# Patient Record
Sex: FEMALE | Race: WHITE | NOT HISPANIC OR LATINO | Employment: PART TIME | URBAN - METROPOLITAN AREA
[De-identification: names, ages, dates, MRNs, and addresses within clinical notes are randomized per-mention and may not be internally consistent; named-entity substitution may affect disease eponyms.]

---

## 2017-12-26 ENCOUNTER — HOSPITAL ENCOUNTER (OUTPATIENT)
Facility: MEDICAL CENTER | Age: 22
End: 2017-12-26
Attending: PHYSICIAN ASSISTANT
Payer: COMMERCIAL

## 2017-12-26 ENCOUNTER — INITIAL PRENATAL (OUTPATIENT)
Dept: OBGYN | Facility: CLINIC | Age: 22
End: 2017-12-26
Payer: COMMERCIAL

## 2017-12-26 VITALS
HEIGHT: 68 IN | SYSTOLIC BLOOD PRESSURE: 100 MMHG | WEIGHT: 119 LBS | BODY MASS INDEX: 18.04 KG/M2 | DIASTOLIC BLOOD PRESSURE: 60 MMHG

## 2017-12-26 DIAGNOSIS — Z34.01 SUPERVISION OF NORMAL FIRST PREGNANCY IN FIRST TRIMESTER: Primary | ICD-10-CM

## 2017-12-26 DIAGNOSIS — Z34.82 PRENATAL CARE, SUBSEQUENT PREGNANCY IN SECOND TRIMESTER: ICD-10-CM

## 2017-12-26 LAB
APPEARANCE UR: NORMAL
BILIRUB UR STRIP-MCNC: NORMAL MG/DL
COLOR UR AUTO: NORMAL
GLUCOSE UR STRIP.AUTO-MCNC: NORMAL MG/DL
KETONES UR STRIP.AUTO-MCNC: NORMAL MG/DL
LEUKOCYTE ESTERASE UR QL STRIP.AUTO: NORMAL
NITRITE UR QL STRIP.AUTO: NORMAL
PH UR STRIP.AUTO: 6 [PH] (ref 5–8)
PROT UR QL STRIP: NORMAL MG/DL
RBC UR QL AUTO: NORMAL
SP GR UR STRIP.AUTO: 1.02
UROBILINOGEN UR STRIP-MCNC: NORMAL MG/DL

## 2017-12-26 PROCEDURE — 87491 CHLMYD TRACH DNA AMP PROBE: CPT

## 2017-12-26 PROCEDURE — 81002 URINALYSIS NONAUTO W/O SCOPE: CPT | Performed by: PHYSICIAN ASSISTANT

## 2017-12-26 PROCEDURE — 88175 CYTOPATH C/V AUTO FLUID REDO: CPT

## 2017-12-26 PROCEDURE — 59401 PR NEW OB VISIT: CPT | Performed by: PHYSICIAN ASSISTANT

## 2017-12-26 PROCEDURE — 87591 N.GONORRHOEAE DNA AMP PROB: CPT

## 2017-12-26 ASSESSMENT — PATIENT HEALTH QUESTIONNAIRE - PHQ9: CLINICAL INTERPRETATION OF PHQ2 SCORE: 0

## 2017-12-26 NOTE — PROGRESS NOTES
NOB visit  Pt c/o having cramping, N&V and headaches. Denies any other complications.  Pt declines flu vaccine today.   # [phone]

## 2017-12-26 NOTE — LETTER
Cystic Fibrosis Carrier Testing  Tnoy Foreman    The following information is about a blood test that can be done to determine if you and/or your partner carry the gene for cystic fibrosis.    WHAT IS CYSTIC FIBROSIS?  · Cystic fibrosis (CF) is an inherited disease that affects more than 25,000 American children and young adults.  · Symptoms of CF vary but include lung congestion, pneumonia, diarrhea and poor growth.  Most people with CF have severe medical problems and some die at a young age.  Others have so few symptoms they are unaware they have CF.  · CF does not affect intelligence.  · Although there is no cure for CF at this time, scientists are making progress in improving treatment and in searching for a cure.  In the past many people with CF  at a very young age.  Today, many are living into their 20’s and 30’s.    IS THERE A CHANCE MY BABY COULD HAVE CYSTIC FIBROSIS?  · You can have a child with CF even if there is no history in your family (see chart below).  · CF testing can help determine if you are a carrier and at risk to have a child with CF.  Note: if both parents are carriers, there is a 1 in 4 (25%) chance with each pregnancy that they will have a child with CF.  · Carriers have one normal CF gene and one altered CF gene.  · People with CF have two altered CF genes.  · Most people have two normal copies of the CF gene.    Approximate risk that a couple with no family history of cystic fibrosis will have a child with cystic fibrosis:    Ethnic background / Risk     couple:  1 in 2,500   couple:  1 in 15,000            couple:  1 in 8,000     American couple:  1 in 32,000     WHAT TESTING IS AVAILABLE?  · There is a blood test that can be done to find out if you or your partner is a carrier.  · It is important to understand that CF carrier testing does not detect all CF carriers.  · If the test shows that you are both CF carriers, you unborn baby can be  tested to find out if the baby has CF.    HOW MUCH DOES IT COST TO HAVE CYSTIC FIBROSIS CARRIER TESTING?  · Cost and insurance coverage for CF carrier testing vary depending upon the laboratory used and your insurance policy.  · The average cost for CF carrier testing is $780 per person.  · Your genetic counselor can provide you with more information about cystic fibrosis carrier testing.    _____  Yes, I am interested in discussing carrier testing with a genetic counselor.    _____  No, I am not interested in CF carrier testing or in receiving more information about CF carrier testing.      Client signature: ________________________________________  12/26/2017

## 2017-12-27 DIAGNOSIS — Z34.01 SUPERVISION OF NORMAL FIRST PREGNANCY IN FIRST TRIMESTER: ICD-10-CM

## 2017-12-29 LAB
C TRACH DNA GENITAL QL NAA+PROBE: NEGATIVE
CYTOLOGY REG CYTOL: NORMAL
N GONORRHOEA DNA GENITAL QL NAA+PROBE: NEGATIVE
SPECIMEN SOURCE: NORMAL

## 2018-01-18 ENCOUNTER — HOSPITAL ENCOUNTER (EMERGENCY)
Facility: MEDICAL CENTER | Age: 23
End: 2018-01-18
Attending: EMERGENCY MEDICINE
Payer: COMMERCIAL

## 2018-01-18 ENCOUNTER — APPOINTMENT (OUTPATIENT)
Dept: RADIOLOGY | Facility: MEDICAL CENTER | Age: 23
End: 2018-01-18
Attending: EMERGENCY MEDICINE
Payer: COMMERCIAL

## 2018-01-18 VITALS
HEART RATE: 78 BPM | HEIGHT: 65 IN | WEIGHT: 121.69 LBS | BODY MASS INDEX: 20.28 KG/M2 | RESPIRATION RATE: 20 BRPM | SYSTOLIC BLOOD PRESSURE: 121 MMHG | TEMPERATURE: 98.6 F | DIASTOLIC BLOOD PRESSURE: 78 MMHG | OXYGEN SATURATION: 99 %

## 2018-01-18 DIAGNOSIS — O02.1 FETAL DEMISE BEFORE 20 WEEKS WITH RETENTION OF DEAD FETUS: ICD-10-CM

## 2018-01-18 LAB
ALBUMIN SERPL BCP-MCNC: 3.5 G/DL (ref 3.2–4.9)
ALBUMIN/GLOB SERPL: 0.9 G/DL
ALP SERPL-CCNC: 46 U/L (ref 30–99)
ALT SERPL-CCNC: 8 U/L (ref 2–50)
ANION GAP SERPL CALC-SCNC: 10 MMOL/L (ref 0–11.9)
APPEARANCE UR: CLEAR
AST SERPL-CCNC: 17 U/L (ref 12–45)
B-HCG SERPL-ACNC: 1161.5 MIU/ML (ref 0–5)
BACTERIA #/AREA URNS HPF: ABNORMAL /HPF
BASOPHILS # BLD AUTO: 0.3 % (ref 0–1.8)
BASOPHILS # BLD: 0.04 K/UL (ref 0–0.12)
BILIRUB SERPL-MCNC: 0.3 MG/DL (ref 0.1–1.5)
BILIRUB UR QL STRIP.AUTO: NEGATIVE
BUN SERPL-MCNC: 13 MG/DL (ref 8–22)
CALCIUM SERPL-MCNC: 9.3 MG/DL (ref 8.5–10.5)
CAOX CRY #/AREA URNS HPF: ABNORMAL /HPF
CHLORIDE SERPL-SCNC: 106 MMOL/L (ref 96–112)
CO2 SERPL-SCNC: 23 MMOL/L (ref 20–33)
COLOR UR: YELLOW
CREAT SERPL-MCNC: 0.6 MG/DL (ref 0.5–1.4)
CULTURE IF INDICATED INDCX: YES UA CULTURE
EOSINOPHIL # BLD AUTO: 0.3 K/UL (ref 0–0.51)
EOSINOPHIL NFR BLD: 2.4 % (ref 0–6.9)
EPI CELLS #/AREA URNS HPF: ABNORMAL /HPF
ERYTHROCYTE [DISTWIDTH] IN BLOOD BY AUTOMATED COUNT: 41.7 FL (ref 35.9–50)
GLOBULIN SER CALC-MCNC: 3.7 G/DL (ref 1.9–3.5)
GLUCOSE SERPL-MCNC: 69 MG/DL (ref 65–99)
GLUCOSE UR STRIP.AUTO-MCNC: NEGATIVE MG/DL
HCG UR QL: POSITIVE
HCT VFR BLD AUTO: 37 % (ref 37–47)
HGB BLD-MCNC: 12.9 G/DL (ref 12–16)
HYALINE CASTS #/AREA URNS LPF: ABNORMAL /LPF
IMM GRANULOCYTES # BLD AUTO: 0.06 K/UL (ref 0–0.11)
IMM GRANULOCYTES NFR BLD AUTO: 0.5 % (ref 0–0.9)
KETONES UR STRIP.AUTO-MCNC: ABNORMAL MG/DL
LEUKOCYTE ESTERASE UR QL STRIP.AUTO: ABNORMAL
LYMPHOCYTES # BLD AUTO: 1.58 K/UL (ref 1–4.8)
LYMPHOCYTES NFR BLD: 12.6 % (ref 22–41)
MCH RBC QN AUTO: 30.9 PG (ref 27–33)
MCHC RBC AUTO-ENTMCNC: 34.9 G/DL (ref 33.6–35)
MCV RBC AUTO: 88.7 FL (ref 81.4–97.8)
MICRO URNS: ABNORMAL
MONOCYTES # BLD AUTO: 0.89 K/UL (ref 0–0.85)
MONOCYTES NFR BLD AUTO: 7.1 % (ref 0–13.4)
NEUTROPHILS # BLD AUTO: 9.71 K/UL (ref 2–7.15)
NEUTROPHILS NFR BLD: 77.1 % (ref 44–72)
NITRITE UR QL STRIP.AUTO: NEGATIVE
NRBC # BLD AUTO: 0 K/UL
NRBC BLD-RTO: 0 /100 WBC
NUMBER OF RH DOSES IND 8505RD: NORMAL
PH UR STRIP.AUTO: 5 [PH]
PLATELET # BLD AUTO: 259 K/UL (ref 164–446)
PMV BLD AUTO: 9 FL (ref 9–12.9)
POTASSIUM SERPL-SCNC: 3.4 MMOL/L (ref 3.6–5.5)
PROT SERPL-MCNC: 7.2 G/DL (ref 6–8.2)
PROT UR QL STRIP: NEGATIVE MG/DL
RBC # BLD AUTO: 4.17 M/UL (ref 4.2–5.4)
RBC # URNS HPF: ABNORMAL /HPF
RBC UR QL AUTO: ABNORMAL
RH BLD: NORMAL
SODIUM SERPL-SCNC: 139 MMOL/L (ref 135–145)
SP GR UR STRIP.AUTO: 1.03
UROBILINOGEN UR STRIP.AUTO-MCNC: 1 MG/DL
WBC # BLD AUTO: 12.6 K/UL (ref 4.8–10.8)
WBC #/AREA URNS HPF: ABNORMAL /HPF

## 2018-01-18 PROCEDURE — 81025 URINE PREGNANCY TEST: CPT

## 2018-01-18 PROCEDURE — 76815 OB US LIMITED FETUS(S): CPT

## 2018-01-18 PROCEDURE — 99284 EMERGENCY DEPT VISIT MOD MDM: CPT

## 2018-01-18 PROCEDURE — 85025 COMPLETE CBC W/AUTO DIFF WBC: CPT

## 2018-01-18 PROCEDURE — 81001 URINALYSIS AUTO W/SCOPE: CPT

## 2018-01-18 PROCEDURE — 80053 COMPREHEN METABOLIC PANEL: CPT

## 2018-01-18 PROCEDURE — 36415 COLL VENOUS BLD VENIPUNCTURE: CPT

## 2018-01-18 PROCEDURE — 86901 BLOOD TYPING SEROLOGIC RH(D): CPT

## 2018-01-18 PROCEDURE — 87086 URINE CULTURE/COLONY COUNT: CPT

## 2018-01-18 PROCEDURE — 84702 CHORIONIC GONADOTROPIN TEST: CPT

## 2018-01-18 ASSESSMENT — PAIN SCALES - GENERAL: PAINLEVEL_OUTOF10: 5

## 2018-01-19 NOTE — ED PROVIDER NOTES
"ED PROVIDER NOTE    Scribed for ZAHRAA Theodore.* by Milan Salomon. 1/18/2018, 10:05 PM.    This is an addendum to the note on Tony Foreman. For further details and full chart entry, see the previously signed ED Provider Note written by Dr. Ha ([facility]).      9:00 PM - I discussed the patient's case with Dr. Ha (ERP) who will transfer care of the patient to me at this time.        10:05 PM I discussed the patient's case and the above findings with radiology who reports fetal demise on ultrasound.    10:05 PM [facility].    10:12 PM I discussed the patient's case and the above findings with Dr. Stein ([facility]) who would like the patient to follow up with the pregnancy center tomorrow.    10:16 PM - Re-examined; The patient is resting in bed comfortably. I discussed her above findings and plans for discharge with a referral to the pregnancy center and instructed to return to the ED if her symptoms worsen. Patient understands and agrees. Her vitals prior to discharge were: /69   Pulse 94   Temp 36.9 °C (98.5 °F)   Resp 16   Ht 1.651 m (5' 5\")   Wt 55.2 kg (121 lb 11.1 oz)   LMP 10/13/2017   SpO2 100%   BMI 20.25 kg/m²     Medical Decision Making: Patient presents with vaginal bleeding and appears may have had a fetal demise around 10 weeks. She is starting to have bleeding now and may pass naturally referred the patient back to her ObGyn to discuss the different options and risks and benefits.    The patient will return for new or worsening symptoms and is stable at the time of discharge.    DISPOSITION:  Patient will be discharged home in stable condition.    FOLLOW UP:  [facility]  [address] [place] [id number] [phone]  Go in 1 day        FINAL IMPRESSION   1. Fetal demise before 20 weeks with retention of dead fetus       I, Milan Salomon (Scribe), am scribing for, and in the presence of, Benito Oscar, " ERIC*.    Electronically signed by: Milan Salomon (Scribe), 1/18/2018    I, ERIC Theodore* personally performed the services described in this documentation, as scribed by Milan Salomon in my presence, and it is both accurate and complete.    The note accurately reflects work and decisions made by me.  Benito Oscar  1/19/2018  4:10 AM

## 2018-01-19 NOTE — DISCHARGE INSTRUCTIONS
Return if you have heavy vaginal bleeding greater than a pad an hour.   Intrauterine Fetal Demise  About one percent of normal, uncomplicated pregnancies end in fetal death (intrauterine fetal demise, IUFD). It is considered a fetal death when it occurs after the 20th week of pregnancy. It is considered a miscarriage when a fetal death occurs in the first 20 weeks. The mother's health is usually not in danger. Usually, there is nothing that can be done to prevent it.  CAUSES  · Often the cause is unknown.   · Examination of the stillborn fetus after delivery may show an abnormality in the umbilical cord. An exam my also show a problem with the placenta or fetus. These problems may include infections or a variety of birth defects and genetic disorders.   · The pregnancy continues for 42 weeks or later (post term pregnancy).   · Conditions in the mother such as diabetes, high blood pressure, and numerous other medical, physical or poor lifestyle choices (illegal drugs, alcohol, smoking) increase the risk for fetal death. Often, however, risk factors are unknown.   · Multiple pregnancies (twins or more) increase the risk of fetal death.   SYMPTOMS   · The mother may not notice symptoms in the early stages of pregnancy. Learning what is wrong (diagnosis) is based on:   · The loss of baby's heart sounds.   · The lack of increasing belly (abdominal) growth.   · Ultrasound studies which suggest death of the fetus.   · In later stages of pregnancy, a woman may be aware of changes in the fetal movement (kicks), or that the movement has stopped.   RELATED COMPLICATIONS  · Disseminated intravascular coagulation (DIC) is a problem with blood clotting. This can result in severe bleeding and rarely develops late after fetal death.   · Infection of the products of the pregnancy (fetal materials).   · Increase bleeding from retained fetal parts or placenta.   TREATMENT   · Treatment should be accomplished within 2 weeks of the  discovered fetal death.   · To confirm the fetal death, diagnostic tests are done such as:   · X-rays.   · Ultrasound.   · Amniotic fluid studies (looking at the fluid in the sac surrounding the baby).   · Most women, on learning that their fetus is dead, prefer early removal of the contents of the womb (uterus). In the first three months of pregnancy (first trimester), this is usually done by D and C or with suction curettage. Suction curettage is a technique used to remove the dead fetus and other tissue of the pregnancy from the uterus. It uses an instrument somewhat like a straw, connected by tubing to a machine, that your caregiver uses to suck out the dead contents of the uterus. NOTE: Suction curettage may be done in the second and third trimester after delivery of the dead fetus only to make sure there is no placental tissue left in the uterus but not to suction out the fetus.   · In the second trimester, treatment is more frequently accomplished with high doses of a drug, prostaglandin E (Prostin) suppositories or in combination with laminaria (as specialized seaweed product that absorbs moisture and expands to gradually stretch and open the cervix). Prostin (T) causes labor to start.   · In the third trimester, it may be accomplished with laminaria and misoprostol vaginal suppositories to induce labor. It may also be done with the drugs intravenous oxytocin plus prostaglandin E.   · If there was an infection involved with the fetal death, you will be given an antibiotic. You will be given Rho-lila if you are Rh negative and the baby is Rh positive (a vaccine to prevent Rh problems with a future pregnancy). An additional treatment option is to wait for spontaneous labor, which usually occurs within 2 weeks, but may be longer. This is called expectant therapy.   · Following removal of the products of the pregnancy, the stillborn fetus is usually examined by a specialist (pathologist) to determine if problems  are present that may reoccur in another pregnancy. This can help plan future pregnancies. That planning will also include treatment which will best guarantee a good outcome in future pregnancies.   · Your caregiver can also help you deal with feelings of loss, guilt, loneliness, anxiety, and hostility. Family and friends can be helpful. If severe grief lasts longer than several months, professional counseling may be helpful. Joining a grief support group may be useful.   · Any medicines prescribed will depend on the type of treatment received.   Other problems can be cared for with your caregivers. There may be discussions on whether or not to see, touch or photograph the infant, whether to name the infant, what to do with the remains (burial or cremation), and holding Mu-ism services.  HOME CARE INSTRUCTIONS   · Restrictions are usually not necessary unless associated with the delivery choice.   · Sexual intercourse should be avoided for 4 to 6 weeks. Starting another pregnancy should be delayed several months, or as suggested by your caregiver.   · Do not use tampons or douche.   · Only take over-the-counter or prescription medicines for pain, discomfort, or fever as directed by your caregiver. Do not take aspirin it can cause you to bleed. Call your caregiver for a prescription for stronger pain medication if you need it.   · No special diet is necessary unless you have diabetes or other medical problems that require a special diet.   · Take showers instead of baths until your caregiver tells you it is okay.   · Ask your caregiver when you can return to driving and to your everyday activities.   · Make an appointment with your caregiver for follow up care.   PREVENTION   · Eliminate any of the causes, if possible, that were found after evaluating the fetus.   · Control any medical problems you may have before or during the pregnancy.   · Avoid illegal drugs, alcohol and smoking.   · Maintain good prenatal care  and follow your caregiver's treatment and advice.   · Report any concerns or unusual changes you notice during your pregnancy.   · More frequent prenatal visits may be necessary with the next child.   SEEK MEDICAL CARE IF:   · You develop abnormal vaginal discharge.   · You develop a temperature 102° F (38.9° C) or higher.   · You are getting dizzy and faint.   · You are feeling depressed.   SEEK IMMEDIATE MEDICAL CARE IF:   During pregnancy:  · You fail to gain weight, or your abdomen is not increasing in size.   · Your unborn child appears to have less movement or stopped moving. Keep your medical conditions under control.   After delivery:  · You have heavy vaginal bleeding.   · You have chills and fever.   · You have chest pain.   · You have shortness of breath.   · You have pain or swelling or redness of your leg.   · Following the death of a fetus, you or a family member need help or emotional support in coping with the grief process.   MAKE SURE YOU:   · Understand these instructions.   · Will watch your condition.   · Will get help right away if you are not doing well or get worse.   Document Released: 12/18/2006 Document Revised: 03/11/2013 Document Reviewed: 09/11/2008  [employer]® Patient Information ©2013 [employer].

## 2018-01-19 NOTE — PROGRESS NOTES
MD received call from the ER physician. Established [facility] patient presented to ER with light vaginal spotting. Vital signs stable. H/H 12.9/37. Ultrasound revealed 10wk fetal demise. Advised for patient to follow up at the [facility] Friday 1/19 to discuss her options for treatment (observation, misoprostol, or D&C). Message sent to [facility]  to call pt for appt.

## 2018-01-19 NOTE — ED NOTES
Chief Complaint   Patient presents with   • Vaginal Bleeding     14weeks pregnant , onset yesterday   • Abdominal Cramping     Pt ambulated to triage ,14weeks pregnant . Vaginal bleeding and cramping started yesterday.   nad.

## 2018-01-19 NOTE — ED NOTES
Pt is aaox4. Pt is in nad. Pt is being d/c. Pt was given d/c instructions and she stated understanding. Pt denies any bleeding at this time.pt is able to amb woi assistance. Pt left with family

## 2018-01-19 NOTE — ED PROVIDER NOTES
"ED Provider Note    Scribed for Caitlin Ha M.D. by Holden Yung. 1/18/2018  7:40 PM    Primary care provider: Pcp Pt States None  Means of arrival: Walk-in  History obtained from: Patient  History limited by: None    CHIEF COMPLAINT  Chief Complaint   Patient presents with   • Vaginal Bleeding     14weeks pregnant , onset yesterday   • Abdominal Cramping       ZEB Foreman is a 22 y.o. female who presents to the Emergency Department for evaluation of vaginal bleeding and abdominal cramping onset yesterday. The patient is currently 13 weeks 6 days pregnant. She describes the bleeding similar to a regular menstrual period with associated minimal tissue noted. She denies any nausea or vomiting. She is G1 and [facility].     REVIEW OF SYSTEMS  GI: Abdominal cramping, no nausea or vomiting.   : Vaginal bleeding.     See history of present illness. All other systems are negative.  C.     PAST MEDICAL HISTORY  G1.     SURGICAL HISTORY  patient denies any surgical history    SOCIAL HISTORY  Social History   Substance Use Topics   • Smoking status: Never Smoker   • Smokeless tobacco: Never Used   • Alcohol use No      History   Drug Use No       FAMILY HISTORY  Family History   Problem Relation Age of Onset   • Diabetes Mother    • Hypertension Mother    • Diabetes Father    • Hypertension Father        CURRENT MEDICATIONS  No current facility-administered medications for this encounter.     Current Outpatient Prescriptions:   •  Prenatal MV-Min-Fe Fum-FA-DHA (PRENATAL 1 PO), Take  by mouth., Disp: , Rfl:   •  ibuprofen (ADVIL) 200 MG Tab, Take 400 mg by mouth every 6 hours as needed., Disp: , Rfl:     ALLERGIES  No Known Allergies    PHYSICAL EXAM  VITAL SIGNS: /69   Pulse 94   Temp 36.9 °C (98.5 °F)   Resp 16   Ht 1.651 m (5' 5\")   Wt 55.2 kg (121 lb 11.1 oz)   LMP 10/13/2017   SpO2 100%   BMI 20.25 kg/m²     Constitutional: Well developed, Well nourished, No acute distress, " Non-toxic appearance.   HEENT: Normocephalic, Atraumatic,  external ears normal, pharynx pink,  Mucous  Membranes moist, No rhinorrhea or mucosal edema  Eyes: PERRL, EOMI, Conjunctiva normal, No discharge.   Neck: Normal range of motion, No tenderness, Supple, No stridor.   Lymphatic: No lymphadenopathy    Cardiovascular: Regular Rate and Rhythm, No murmurs,  rubs, or gallops.   Thorax & Lungs: Lungs clear to auscultation bilaterally, No respiratory distress, No wheezes, rhales or rhonchi, No chest wall tenderness.   Abdomen: Bowel sounds normal, Soft, non tender, non distended,  No pulsatile masses., no rebound guarding or peritoneal signs.   Pelvic Exam: Assisted by female scribe, minimal bleeding in vault with tissue noted, os closed.   Skin: Warm, Dry, No erythema, No rash,   Back:  No CVA tenderness,  No spinal tenderness, bony crepitance, step offs, or instability.   Neurologic: Alert & oriented x 3, Normal motor function, Normal sensory function, No focal deficits noted. Normal reflexes. Normal Cranial Nerves.  Extremities: Equal, intact distal pulses, No cyanosis, clubbing or edema,  No tenderness.   Musculoskeletal: Good range of motion in all major joints. No tenderness to palpation or major deformities noted.     DIAGNOSTIC STUDIES / PROCEDURES    LABS  Results for orders placed or performed during the hospital encounter of 01/18/18   CBC WITH DIFFERENTIAL   Result Value Ref Range    WBC 12.6 (H) 4.8 - 10.8 K/uL    RBC 4.17 (L) 4.20 - 5.40 M/uL    Hemoglobin 12.9 12.0 - 16.0 g/dL    Hematocrit 37.0 37.0 - 47.0 %    MCV 88.7 81.4 - 97.8 fL    MCH 30.9 27.0 - 33.0 pg    MCHC 34.9 33.6 - 35.0 g/dL    RDW 41.7 35.9 - 50.0 fL    Platelet Count 259 164 - 446 K/uL    MPV 9.0 9.0 - 12.9 fL    Neutrophils-Polys 77.10 (H) 44.00 - 72.00 %    Lymphocytes 12.60 (L) 22.00 - 41.00 %    Monocytes 7.10 0.00 - 13.40 %    Eosinophils 2.40 0.00 - 6.90 %    Basophils 0.30 0.00 - 1.80 %    Immature Granulocytes 0.50 0.00 -  0.90 %    Nucleated RBC 0.00 /100 WBC    Neutrophils (Absolute) 9.71 (H) 2.00 - 7.15 K/uL    Lymphs (Absolute) 1.58 1.00 - 4.80 K/uL    Monos (Absolute) 0.89 (H) 0.00 - 0.85 K/uL    Eos (Absolute) 0.30 0.00 - 0.51 K/uL    Baso (Absolute) 0.04 0.00 - 0.12 K/uL    Immature Granulocytes (abs) 0.06 0.00 - 0.11 K/uL    NRBC (Absolute) 0.00 K/uL   COMP METABOLIC PANEL   Result Value Ref Range    Sodium 139 135 - 145 mmol/L    Potassium 3.4 (L) 3.6 - 5.5 mmol/L    Chloride 106 96 - 112 mmol/L    Co2 23 20 - 33 mmol/L    Anion Gap 10.0 0.0 - 11.9    Glucose 69 65 - 99 mg/dL    Bun 13 8 - 22 mg/dL    Creatinine 0.60 0.50 - 1.40 mg/dL    Calcium 9.3 8.5 - 10.5 mg/dL    AST(SGOT) 17 12 - 45 U/L    ALT(SGPT) 8 2 - 50 U/L    Alkaline Phosphatase 46 30 - 99 U/L    Total Bilirubin 0.3 0.1 - 1.5 mg/dL    Albumin 3.5 3.2 - 4.9 g/dL    Total Protein 7.2 6.0 - 8.2 g/dL    Globulin 3.7 (H) 1.9 - 3.5 g/dL    A-G Ratio 0.9 g/dL   RH TYPE FOR RHOGAM FROM E.D.   Result Value Ref Range    Emergency Department Rh Typing POS     Number Of Rh Doses Indicated ZERO    URINALYSIS CULTURE, IF INDICATED   Result Value Ref Range    Color Yellow     Character Clear     Specific Gravity 1.031 <1.035    Ph 5.0 5.0 - 8.0    Glucose Negative Negative mg/dL    Ketones Trace (A) Negative mg/dL    Protein Negative Negative mg/dL    Bilirubin Negative Negative    Urobilinogen, Urine 1.0 Negative    Nitrite Negative Negative    Leukocyte Esterase Trace (A) Negative    Occult Blood Large (A) Negative    Micro Urine Req Microscopic     Culture Indicated Yes UA Culture   URINE MICROSCOPIC (W/UA)   Result Value Ref Range    WBC 2-5 /hpf    RBC 2-5 (A) /hpf    Bacteria Few (A) None /hpf    Epithelial Cells Few /hpf    Ca Oxalate Crystal Few /hpf    Hyaline Cast 0-2 /lpf   ESTIMATED GFR   Result Value Ref Range    GFR If African American >60 >60 mL/min/1.73 m 2    GFR If Non African American >60 >60 mL/min/1.73 m 2   POC URINE PREGNANCY   Result Value Ref Range     POC Urine Pregnancy Test Positive (A) Negative   All labs reviewed by me.    RADIOLOGY  US-OB PELVIS TRANSVAGINAL    (Results Pending)   The radiologist's interpretation of all radiological studies have been reviewed by me.    COURSE & MEDICAL DECISION MAKING  Nursing notes, VS, PMSFHx reviewed in chart.    7:44 PM - Patient seen and examined at bedside. Ordered US-OB pelvis transvaginal, RH type, urinalysis culture, HCG quant serum, POC urine pregnancy, CBC, and CMP to evaluate her symptoms. The differential diagnoses include but are not limited to: miscarriage, threatened AB, first trimester bleeding.     7:46 PM Pelvic Exam was performed with assisted female scribe as chaperone, see above note for more details.     Reviewed the patient's lab and imaging results, which reveals a normal hemoglobin. These results were discussed with the patient at bedside. Per ultrasound and quantitative hCG will be followed by Dr. Oscar he will give her the final results. I have recommended she follow [facility]. The patient will be discharged and should return if symptoms worsen or if new symptoms arise. The patient understands and agrees to plan.      The patient will return for new or worsening symptoms and is stable at the time of discharge.    The patient is referred to a primary physician for blood pressure management, diabetic screening, and for all other preventative health concerns.    DISPOSITION:  Patient will be discharged home in stable condition.    FOLLOW UP:  [facility]  [address] [place] [id number] [phone]  Call in 1 day  for recheck      OUTPATIENT MEDICATIONS:  New Prescriptions    No medications on file     FINAL IMPRESSION  1. , threatened          IHolden ([place]), am scribing for, and in the presence of, Caitlin Ha M.D..    Electronically signed by: Holden Yung ([place]), 2018    Caitlin KIM M.D. personally  performed the services described in this documentation, as scribed by Holden Yung in my presence, and it is both accurate and complete.    The note accurately reflects work and decisions made by me.  Caitlin Ha  1/18/2018  9:28 PM

## 2018-01-21 LAB
BACTERIA UR CULT: NORMAL
SIGNIFICANT IND 70042: NORMAL
SITE SITE: NORMAL
SOURCE SOURCE: NORMAL

## 2018-07-05 ENCOUNTER — NON-PROVIDER VISIT (OUTPATIENT)
Dept: OBGYN | Facility: CLINIC | Age: 23
End: 2018-07-05

## 2018-07-05 DIAGNOSIS — Z32.01 PREGNANCY EXAMINATION OR TEST, POSITIVE RESULT: ICD-10-CM

## 2018-07-05 LAB
INT CON NEG: NEGATIVE
INT CON POS: POSITIVE
POC URINE PREGNANCY TEST: POSITIVE

## 2018-07-05 PROCEDURE — 81025 URINE PREGNANCY TEST: CPT | Performed by: OBSTETRICS & GYNECOLOGY

## 2018-08-27 PROBLEM — Z34.01 SUPERVISION OF NORMAL FIRST PREGNANCY IN FIRST TRIMESTER: Status: RESOLVED | Noted: 2017-12-26 | Resolved: 2018-08-27

## 2018-08-31 ENCOUNTER — APPOINTMENT (OUTPATIENT)
Dept: OBGYN | Facility: CLINIC | Age: 23
End: 2018-08-31

## 2018-09-14 ENCOUNTER — APPOINTMENT (OUTPATIENT)
Dept: RADIOLOGY | Facility: MEDICAL CENTER | Age: 23
End: 2018-09-14
Attending: OBSTETRICS & GYNECOLOGY

## 2018-09-14 ENCOUNTER — HOSPITAL ENCOUNTER (EMERGENCY)
Facility: MEDICAL CENTER | Age: 23
End: 2018-09-14
Payer: MEDICAID

## 2018-09-14 ENCOUNTER — HOSPITAL ENCOUNTER (OUTPATIENT)
Facility: MEDICAL CENTER | Age: 23
End: 2018-09-14
Attending: OBSTETRICS & GYNECOLOGY | Admitting: OBSTETRICS & GYNECOLOGY

## 2018-09-14 VITALS
HEIGHT: 65 IN | DIASTOLIC BLOOD PRESSURE: 70 MMHG | WEIGHT: 150 LBS | TEMPERATURE: 98.1 F | RESPIRATION RATE: 16 BRPM | HEART RATE: 104 BPM | BODY MASS INDEX: 24.99 KG/M2 | SYSTOLIC BLOOD PRESSURE: 113 MMHG

## 2018-09-14 LAB
ABO GROUP BLD: NORMAL
BASOPHILS # BLD AUTO: 0.2 % (ref 0–1.8)
BASOPHILS # BLD: 0.02 K/UL (ref 0–0.12)
BLD GP AB SCN SERPL QL: NORMAL
EOSINOPHIL # BLD AUTO: 0.12 K/UL (ref 0–0.51)
EOSINOPHIL NFR BLD: 1.3 % (ref 0–6.9)
ERYTHROCYTE [DISTWIDTH] IN BLOOD BY AUTOMATED COUNT: 37.6 FL (ref 35.9–50)
HBV SURFACE AG SER QL: NEGATIVE
HCT VFR BLD AUTO: 31.8 % (ref 37–47)
HCV AB SER QL: NEGATIVE
HGB BLD-MCNC: 10.4 G/DL (ref 12–16)
HIV 1+2 AB+HIV1 P24 AG SERPL QL IA: NON REACTIVE
IMM GRANULOCYTES # BLD AUTO: 0.08 K/UL (ref 0–0.11)
IMM GRANULOCYTES NFR BLD AUTO: 0.8 % (ref 0–0.9)
LYMPHOCYTES # BLD AUTO: 1.36 K/UL (ref 1–4.8)
LYMPHOCYTES NFR BLD: 14.3 % (ref 22–41)
MCH RBC QN AUTO: 28.3 PG (ref 27–33)
MCHC RBC AUTO-ENTMCNC: 32.7 G/DL (ref 33.6–35)
MCV RBC AUTO: 86.6 FL (ref 81.4–97.8)
MONOCYTES # BLD AUTO: 0.66 K/UL (ref 0–0.85)
MONOCYTES NFR BLD AUTO: 6.9 % (ref 0–13.4)
NEUTROPHILS # BLD AUTO: 7.29 K/UL (ref 2–7.15)
NEUTROPHILS NFR BLD: 76.5 % (ref 44–72)
NRBC # BLD AUTO: 0 K/UL
NRBC BLD-RTO: 0 /100 WBC
PLATELET # BLD AUTO: 221 K/UL (ref 164–446)
PMV BLD AUTO: 9.1 FL (ref 9–12.9)
RBC # BLD AUTO: 3.67 M/UL (ref 4.2–5.4)
RH BLD: NORMAL
RUBV AB SER QL: 11.2 IU/ML
TREPONEMA PALLIDUM IGG+IGM AB [PRESENCE] IN SERUM OR PLASMA BY IMMUNOASSAY: NON REACTIVE
WBC # BLD AUTO: 9.5 K/UL (ref 4.8–10.8)

## 2018-09-14 PROCEDURE — 87340 HEPATITIS B SURFACE AG IA: CPT

## 2018-09-14 PROCEDURE — 86901 BLOOD TYPING SEROLOGIC RH(D): CPT

## 2018-09-14 PROCEDURE — 85025 COMPLETE CBC W/AUTO DIFF WBC: CPT

## 2018-09-14 PROCEDURE — 86900 BLOOD TYPING SEROLOGIC ABO: CPT

## 2018-09-14 PROCEDURE — 59025 FETAL NON-STRESS TEST: CPT | Performed by: OBSTETRICS & GYNECOLOGY

## 2018-09-14 PROCEDURE — 86780 TREPONEMA PALLIDUM: CPT

## 2018-09-14 PROCEDURE — 86850 RBC ANTIBODY SCREEN: CPT

## 2018-09-14 PROCEDURE — 86803 HEPATITIS C AB TEST: CPT

## 2018-09-14 PROCEDURE — 76805 OB US >/= 14 WKS SNGL FETUS: CPT

## 2018-09-14 PROCEDURE — 86762 RUBELLA ANTIBODY: CPT

## 2018-09-14 PROCEDURE — 36415 COLL VENOUS BLD VENIPUNCTURE: CPT

## 2018-09-14 PROCEDURE — 87389 HIV-1 AG W/HIV-1&-2 AB AG IA: CPT
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
## 2018-10-02 ENCOUNTER — HOSPITAL ENCOUNTER (OUTPATIENT)
Facility: MEDICAL CENTER | Age: 23
End: 2018-10-02
Attending: PHYSICIAN ASSISTANT
Payer: COMMERCIAL

## 2018-10-02 ENCOUNTER — INITIAL PRENATAL (OUTPATIENT)
Dept: OBGYN | Facility: CLINIC | Age: 23
End: 2018-10-02

## 2018-10-02 VITALS — BODY MASS INDEX: 26.63 KG/M2 | SYSTOLIC BLOOD PRESSURE: 98 MMHG | WEIGHT: 160 LBS | DIASTOLIC BLOOD PRESSURE: 70 MMHG

## 2018-10-02 DIAGNOSIS — Z34.90 ENCOUNTER FOR SUPERVISION OF NORMAL PREGNANCY, ANTEPARTUM, UNSPECIFIED GRAVIDITY: Primary | ICD-10-CM

## 2018-10-02 DIAGNOSIS — Z34.90 ENCOUNTER FOR SUPERVISION OF NORMAL PREGNANCY, ANTEPARTUM, UNSPECIFIED GRAVIDITY: ICD-10-CM

## 2018-10-02 DIAGNOSIS — Z34.83 ENCOUNTER FOR SUPERVISION OF OTHER NORMAL PREGNANCY IN THIRD TRIMESTER: ICD-10-CM

## 2018-10-02 DIAGNOSIS — O99.013 ANEMIA AFFECTING PREGNANCY IN THIRD TRIMESTER: ICD-10-CM

## 2018-10-02 PROBLEM — Z34.93 SUPERVISION OF NORMAL PREGNANCY IN THIRD TRIMESTER: Status: ACTIVE | Noted: 2018-10-02

## 2018-10-02 LAB
APPEARANCE UR: NORMAL
BILIRUB UR STRIP-MCNC: NORMAL MG/DL
COLOR UR AUTO: NORMAL
GLUCOSE UR STRIP.AUTO-MCNC: NEGATIVE MG/DL
KETONES UR STRIP.AUTO-MCNC: NEGATIVE MG/DL
LEUKOCYTE ESTERASE UR QL STRIP.AUTO: NORMAL
NITRITE UR QL STRIP.AUTO: NEGATIVE
PH UR STRIP.AUTO: 7 [PH] (ref 5–8)
PROT UR QL STRIP: NEGATIVE MG/DL
RBC UR QL AUTO: NORMAL
SP GR UR STRIP.AUTO: 1.02
UROBILINOGEN UR STRIP-MCNC: NORMAL MG/DL

## 2018-10-02 PROCEDURE — 8198 PREG CTR PKG RATE (SYSTEM): Performed by: PHYSICIAN ASSISTANT

## 2018-10-02 PROCEDURE — 81002 URINALYSIS NONAUTO W/O SCOPE: CPT | Performed by: PHYSICIAN ASSISTANT

## 2018-10-02 PROCEDURE — 59401 PR NEW OB VISIT: CPT | Performed by: PHYSICIAN ASSISTANT

## 2018-10-02 PROCEDURE — 90471 IMMUNIZATION ADMIN: CPT | Performed by: PHYSICIAN ASSISTANT

## 2018-10-02 PROCEDURE — 90715 TDAP VACCINE 7 YRS/> IM: CPT | Performed by: PHYSICIAN ASSISTANT

## 2018-10-02 ASSESSMENT — ENCOUNTER SYMPTOMS
PSYCHIATRIC NEGATIVE: 1
RESPIRATORY NEGATIVE: 1
NEUROLOGICAL NEGATIVE: 1
GASTROINTESTINAL NEGATIVE: 1
CONSTITUTIONAL NEGATIVE: 1
MUSCULOSKELETAL NEGATIVE: 1
EYES NEGATIVE: 1
CARDIOVASCULAR NEGATIVE: 1

## 2018-10-02 NOTE — LETTER
Cystic Fibrosis Carrier Testing  Tony Foreman    The following information is about a blood test that can be done to determine if you and/or your partner carry the gene for cystic fibrosis.    WHAT IS CYSTIC FIBROSIS?  · Cystic fibrosis (CF) is an inherited disease that affects more than 25,000 American children and young adults.  · Symptoms of CF vary but include lung congestion, pneumonia, diarrhea and poor growth.  Most people with CF have severe medical problems and some die at a young age.  Others have so few symptoms they are unaware they have CF.  · CF does not affect intelligence.  · Although there is no cure for CF at this time, scientists are making progress in improving treatment and in searching for a cure.  In the past many people with CF  at a very young age.  Today, many are living into their 20’s and 30’s.    IS THERE A CHANCE MY BABY COULD HAVE CYSTIC FIBROSIS?  · You can have a child with CF even if there is no history in your family (see chart below).  · CF testing can help determine if you are a carrier and at risk to have a child with CF.  Note: if both parents are carriers, there is a 1 in 4 (25%) chance with each pregnancy that they will have a child with CF.  · Carriers have one normal CF gene and one altered CF gene.  · People with CF have two altered CF genes.  · Most people have two normal copies of the CF gene.    Approximate risk that a couple with no family history of cystic fibrosis will have a child with cystic fibrosis:    Ethnic background / Risk     couple:  1 in 2,500   couple:  1 in 15,000            couple:  1 in 8,000     American couple:  1 in 32,000     WHAT TESTING IS AVAILABLE?  · There is a blood test that can be done to find out if you or your partner is a carrier.  · It is important to understand that CF carrier testing does not detect all CF carriers.  · If the test shows that you are both CF carriers, you unborn baby can be  tested to find out if the baby has CF.    HOW MUCH DOES IT COST TO HAVE CYSTIC FIBROSIS CARRIER TESTING?  · Cost and insurance coverage for CF carrier testing vary depending upon the laboratory used and your insurance policy.  · The average cost for CF carrier testing is $300 per person.  · Your genetic counselor can provide you with more information about cystic fibrosis carrier testing.    _____  Yes, I am interested in discussing carrier testing with a genetic counselor.    _____  No, I am not interested in CF carrier testing or in receiving more information about CF carrier testing.      Client signature: ________________________________________  10/2/2018

## 2018-10-02 NOTE — LETTER
"Count Your Baby's Movements  Another step to a healthy delivery    Tony Foreman,             Dept: [phone]    How Many Weeks Pregnant? 32w3d    Date to Begin Counting: 10/2/18              How to use this chart    One way for your physician to keep track of your baby's health is by knowing how often the baby moves (or \"kicks\") in your womb.  You can help your physician to do this by using this chart every day.    Every day, you should see how many hours it takes for your baby to move 10 times.  Start in the morning, as soon as you get up.    · First, write down the time your baby moves until you get to 10.  · Check off one box every time your baby moves until you get to 10.  · Write down the time you finished counting in the last column.  · Total how long it took to count up all 10 movements.  · Finally, fill in the box that shows how long this took.  After counting 10 movements, you no longer have to count any more that day.  The next morning, just start counting again as soon as you get up.    What should you call a \"movement\"?  It is hard to say, because it will feel different from one mother to another and from one pregnancy to the next.  The important thing is that you count the movements the same way throughout your pregnancy.  If you have more questions, you should ask your physician.    Count carefully every day!  SAMPLE:  Week 28    How many hours did it take to feel 10 movements?       Start  Time     1     2     3     4     5     6     7     8     9     10   Finish Time   Mon 8:20 ·  ·  ·  ·  ·  ·  ·  ·  ·  ·  11:40   Tue Wed Thu Fri               Sat               Sun                 IMPORTANT: You should contact your physician if it takes more than two hours for you to feel 10 movements.  Each morning, write down the time and start to count the movements of your baby.  Keep track by checking off one box every time you feel one movement.  When you have felt " "10 \"kicks\", write down the time you finished counting in the last column.  Then fill in the   box (over the check benja) for the number of hours it took.  Be sure to read the complete instructions on the previous page.            "

## 2018-10-02 NOTE — PROGRESS NOTES
Pt. Here for NOB visit today.  #[phone]  First prenatal care  Pt. States some cramping and [place]  Pharmacy verified  AFP pt is too late  PNP done in L&D  Pap 2017-WNL  BLANCA sheet instructions given  1 hr GTT today  Pt desires Tdap  Pt would like to wait till after delivery for Flu vaccine  CF test pt needs to talk over with FOB  Chaperone offered and present

## 2018-10-02 NOTE — PROGRESS NOTES
Subjective:      Tony Foreman is a 23 y.o. female who presents with New ob visit. Pt sure of LMP, and US at 32wk confirms MATTHEW 11/24/18. First pregnancy was SAB at 13wk without complications, no D&C needed. Pt had US and PNL done in L&D, all wnl, though low H/H, so pt instructed to start taking FeSO4 supplements. Pt denies PMHx, Shx. Taking PNV and DHA daily. NKDA. No tob, etoh or drug use. Pt currently denies cramping, bleeding or pain. +FM - FKC sheet given today.     PE: See below. Size c/w dates. +FHT by Doppler.   Cervix: Cl/50/-2, post, med, vtx    A/P: 1. IUP at 32w2d - PAP wnl 12/17, GC/CT done today. PNL wnl. 1hr GTT, H/H, RPR slip given. US wnl - no more needed. RTC 2 wks.  2. Late care - no AFP             HPI    Review of Systems   Constitutional: Negative.    HENT: Negative.    Eyes: Negative.    Respiratory: Negative.    Cardiovascular: Negative.    Gastrointestinal: Negative.    Genitourinary: Negative.    Musculoskeletal: Negative.    Skin: Negative.    Neurological: Negative.    Endo/Heme/Allergies: Negative.    Psychiatric/Behavioral: Negative.           Objective:     BP (!) 98/70   Wt 72.6 kg (160 lb)   LMP 02/17/2018 (Approximate)   BMI 26.63 kg/m²      Physical Exam   Constitutional: She appears well-developed and well-nourished.   HENT:   Head: Normocephalic and atraumatic.   Eyes: Pupils are equal, round, and reactive to light.   Neck: Normal range of motion. Neck supple. No thyromegaly present.   Cardiovascular: Normal rate, regular rhythm and normal heart sounds.    Pulmonary/Chest: Effort normal and breath sounds normal. No respiratory distress.   Abdominal: Soft. Bowel sounds are normal. She exhibits no distension. There is no tenderness.   Genitourinary: Vagina normal. Pelvic exam was performed with patient supine. There is no rash or tenderness on the right labia. There is no rash or tenderness on the left labia. Uterus is enlarged (Gravid, Uterus c/w 31wk). Uterus is not deviated and  not tender. Cervix exhibits no motion tenderness. Right adnexum displays no mass and no tenderness. Left adnexum displays no mass and no tenderness. No erythema in the vagina. No foreign body in the vagina. No signs of injury around the vagina. No vaginal discharge found.   Neurological: She is alert. She has normal reflexes.   Skin: Skin is warm and dry. No erythema.   Psychiatric: She has a normal mood and affect. Her behavior is normal. Thought content normal.   Vitals reviewed.              Assessment/Plan:     1. Encounter for supervision of normal pregnancy, antepartum, unspecified   - F/u 2 wik, no US needed; Declines Flu vaccine til after delivery  - GLUCOSE 1HR GESTATIONAL; Future  - POCT Urinalysis  - CHLAMYDIA/GC PCR URINE OR SWAB; Future  - Tdap Vaccine =>6YO IM  - HCT; Future  - HGB; Future    2. Anemia affecting pregnancy in third trimester - 10.4/31.8 on 18  - Pt instructed to start FeSO4 supplements

## 2018-10-03 LAB
C TRACH DNA SPEC QL NAA+PROBE: NEGATIVE
N GONORRHOEA DNA SPEC QL NAA+PROBE: NEGATIVE
SPECIMEN SOURCE: NORMAL

## 2018-10-15 ENCOUNTER — HOSPITAL ENCOUNTER (OUTPATIENT)
Dept: LAB | Facility: MEDICAL CENTER | Age: 23
End: 2018-10-15
Attending: PHYSICIAN ASSISTANT
Payer: COMMERCIAL

## 2018-10-15 DIAGNOSIS — Z34.01 SUPERVISION OF NORMAL FIRST PREGNANCY IN FIRST TRIMESTER: ICD-10-CM

## 2018-10-15 DIAGNOSIS — Z34.90 ENCOUNTER FOR SUPERVISION OF NORMAL PREGNANCY, ANTEPARTUM, UNSPECIFIED GRAVIDITY: ICD-10-CM

## 2018-10-15 LAB
ABO GROUP BLD: NORMAL
BACTERIA #/AREA URNS HPF: ABNORMAL /HPF
BLD GP AB SCN SERPL QL: NORMAL
EPI CELLS #/AREA URNS HPF: ABNORMAL /HPF
HCT VFR BLD AUTO: 37.2 % (ref 37–47)
HGB BLD-MCNC: 12 G/DL (ref 12–16)
HYALINE CASTS #/AREA URNS LPF: ABNORMAL /LPF
RBC # URNS HPF: ABNORMAL /HPF
RH BLD: NORMAL
WBC #/AREA URNS HPF: ABNORMAL /HPF

## 2018-10-16 ENCOUNTER — ROUTINE PRENATAL (OUTPATIENT)
Dept: OBGYN | Facility: CLINIC | Age: 23
End: 2018-10-16

## 2018-10-16 VITALS — SYSTOLIC BLOOD PRESSURE: 112 MMHG | DIASTOLIC BLOOD PRESSURE: 68 MMHG | WEIGHT: 168 LBS | BODY MASS INDEX: 27.96 KG/M2

## 2018-10-16 DIAGNOSIS — Z34.83 ENCOUNTER FOR SUPERVISION OF OTHER NORMAL PREGNANCY IN THIRD TRIMESTER: ICD-10-CM

## 2018-10-16 PROBLEM — O99.013 ANEMIA AFFECTING PREGNANCY IN THIRD TRIMESTER: Status: RESOLVED | Noted: 2018-10-02 | Resolved: 2018-10-16

## 2018-10-16 LAB
APPEARANCE UR: CLEAR
BASOPHILS # BLD AUTO: 0.1 % (ref 0–1.8)
BASOPHILS # BLD: 0.01 K/UL (ref 0–0.12)
BILIRUB UR QL STRIP.AUTO: NEGATIVE
COLOR UR: YELLOW
EOSINOPHIL # BLD AUTO: 0.07 K/UL (ref 0–0.51)
EOSINOPHIL NFR BLD: 0.9 % (ref 0–6.9)
ERYTHROCYTE [DISTWIDTH] IN BLOOD BY AUTOMATED COUNT: 38.8 FL (ref 35.9–50)
GLUCOSE 1H P 50 G GLC PO SERPL-MCNC: 125 MG/DL (ref 70–139)
GLUCOSE UR STRIP.AUTO-MCNC: 100 MG/DL
HBV SURFACE AG SER QL: NEGATIVE
HCT VFR BLD AUTO: 37 % (ref 37–47)
HGB BLD-MCNC: 11.8 G/DL (ref 12–16)
HIV 1+2 AB+HIV1 P24 AG SERPL QL IA: NON REACTIVE
IMM GRANULOCYTES # BLD AUTO: 0.05 K/UL (ref 0–0.11)
IMM GRANULOCYTES NFR BLD AUTO: 0.7 % (ref 0–0.9)
KETONES UR STRIP.AUTO-MCNC: NEGATIVE MG/DL
LEUKOCYTE ESTERASE UR QL STRIP.AUTO: ABNORMAL
LYMPHOCYTES # BLD AUTO: 1.05 K/UL (ref 1–4.8)
LYMPHOCYTES NFR BLD: 14.2 % (ref 22–41)
MCH RBC QN AUTO: 27.3 PG (ref 27–33)
MCHC RBC AUTO-ENTMCNC: 31.9 G/DL (ref 33.6–35)
MCV RBC AUTO: 85.6 FL (ref 81.4–97.8)
MICRO URNS: ABNORMAL
MONOCYTES # BLD AUTO: 0.4 K/UL (ref 0–0.85)
MONOCYTES NFR BLD AUTO: 5.4 % (ref 0–13.4)
NEUTROPHILS # BLD AUTO: 5.82 K/UL (ref 2–7.15)
NEUTROPHILS NFR BLD: 78.7 % (ref 44–72)
NITRITE UR QL STRIP.AUTO: NEGATIVE
NRBC # BLD AUTO: 0 K/UL
NRBC BLD-RTO: 0 /100 WBC
PH UR STRIP.AUTO: 6 [PH]
PLATELET # BLD AUTO: 298 K/UL (ref 164–446)
PMV BLD AUTO: 11 FL (ref 9–12.9)
PROT UR QL STRIP: NEGATIVE MG/DL
RBC # BLD AUTO: 4.32 M/UL (ref 4.2–5.4)
RBC UR QL AUTO: NEGATIVE
RUBV AB SER QL: 16.9 IU/ML
SP GR UR STRIP.AUTO: 1.01
TREPONEMA PALLIDUM IGG+IGM AB [PRESENCE] IN SERUM OR PLASMA BY IMMUNOASSAY: NON REACTIVE
UROBILINOGEN UR STRIP.AUTO-MCNC: 0.2 MG/DL
WBC # BLD AUTO: 7.4 K/UL (ref 4.8–10.8)

## 2018-10-16 PROCEDURE — 90040 PR PRENATAL FOLLOW UP: CPT | Performed by: PHYSICIAN ASSISTANT

## 2018-10-16 NOTE — PROGRESS NOTES
Pt has no complaints with cramping, UCs, Vb, LOF, though pt has had some cramping and tightening at night only. PTL precautions reviewed. +FM. 1hr GTT done yesterday - results pending. H/H wnl - no anemia, so pt aware and will continue current regime. US also wnl - pt aware. Encouraged to get flu vaccine, none in clinic today. RTC 1-2 wk or sooner prn. GBS next visit.

## 2018-10-16 NOTE — PROGRESS NOTES
Pt here today for OB follow up  Pt states no complaints   Reports +  Good # [phone]  Pharmacy Confirmed.

## 2018-10-30 ENCOUNTER — ROUTINE PRENATAL (OUTPATIENT)
Dept: OBGYN | Facility: CLINIC | Age: 23
End: 2018-10-30

## 2018-10-30 ENCOUNTER — HOSPITAL ENCOUNTER (OUTPATIENT)
Facility: MEDICAL CENTER | Age: 23
End: 2018-10-30
Attending: NURSE PRACTITIONER
Payer: COMMERCIAL

## 2018-10-30 VITALS — BODY MASS INDEX: 28.12 KG/M2 | SYSTOLIC BLOOD PRESSURE: 110 MMHG | WEIGHT: 169 LBS | DIASTOLIC BLOOD PRESSURE: 74 MMHG

## 2018-10-30 DIAGNOSIS — Z34.83 ENCOUNTER FOR SUPERVISION OF OTHER NORMAL PREGNANCY IN THIRD TRIMESTER: Primary | ICD-10-CM

## 2018-10-30 DIAGNOSIS — Z34.83 ENCOUNTER FOR SUPERVISION OF OTHER NORMAL PREGNANCY IN THIRD TRIMESTER: ICD-10-CM

## 2018-10-30 PROCEDURE — 90040 PR PRENATAL FOLLOW UP: CPT | Performed by: NURSE PRACTITIONER

## 2018-10-30 NOTE — PROGRESS NOTES
S) Pt is a 23 y.o.   at 36w3d  gestation. Routine prenatal care today. No complaints today. GBS collected today. Labor precautions discussed. SVE and IOL referral at 38 weeks, all questions answered.    Fetal movement Normal  Cramping no  VB no  LOF no   Denies dysuria. Generally feels well today. Good self-care activities identified. Denies headaches, swelling, visual changes, or epigastric pain .     O) Blood pressure 110/74, weight 76.7 kg (169 lb), last menstrual period 2018, unknown if currently breastfeeding.        Labs:       PNL: WNL       GCT: 125        AFP: Not Examined       GBS: Collected today       Pertinent ultrasound -        18- Survey WNL, KEILY 15.3cm, c/w prev dating.    A) IUP at 36w3d       S=D         Patient Active Problem List    Diagnosis Date Noted   • Supervision of normal pregnancy in third trimester 10/02/2018          SVE: deferred       Chaperone offered: yes but declines         TDAP: yes       FLU: no        BTL: no       : n/a       C/S Consent: n/a       IOL or C/S scheduled: no       LAST PAP: 2017- Negative         P) s/s ptl vs general discomforts. Fetal movements reviewed. General ed and anticipatory guidance. Nutrition/exercise/vitamin. Plans breast Plans pp contraception- unsure  Continue PNV.

## 2018-10-30 NOTE — PROGRESS NOTES
Pt here today for OB follow up  Pt states no complaints   Reports +FM  Good # [phone]  Pharmacy Confirmed.  Chaperone offered and present.   GBS today

## 2018-11-01 LAB — GP B STREP DNA SPEC QL NAA+PROBE: NEGATIVE

## 2018-11-06 ENCOUNTER — ROUTINE PRENATAL (OUTPATIENT)
Dept: OBGYN | Facility: CLINIC | Age: 23
End: 2018-11-06

## 2018-11-06 VITALS — SYSTOLIC BLOOD PRESSURE: 122 MMHG | DIASTOLIC BLOOD PRESSURE: 72 MMHG | WEIGHT: 174 LBS | BODY MASS INDEX: 28.96 KG/M2

## 2018-11-06 DIAGNOSIS — Z3A.37 37 WEEKS GESTATION OF PREGNANCY: ICD-10-CM

## 2018-11-06 PROCEDURE — 90040 PR PRENATAL FOLLOW UP: CPT | Performed by: NURSE PRACTITIONER

## 2018-11-06 PROCEDURE — 90471 IMMUNIZATION ADMIN: CPT | Performed by: NURSE PRACTITIONER

## 2018-11-06 PROCEDURE — 90686 IIV4 VACC NO PRSV 0.5 ML IM: CPT | Performed by: NURSE PRACTITIONER

## 2018-11-06 NOTE — PROGRESS NOTES
OB F/U  Denies VB, LOF, or UC  +FM  Phone#:[phone]  Pharmacy Confirmed.  C/O:   GBS Negative   Flu offered and administered. Verified by Bella

## 2018-11-06 NOTE — PROGRESS NOTES
SUBJECTIVE:  Pt is a 23 y.o.   at 37w3d  gestation. Presents today for follow-up prenatal care. Reports no issues at this time.  Reports good  fetal movement. Denies cramping/contractions, bleeding or leaking of fluid. Denies dysuria, headaches, N/V, or other issues at this time. Generally feels well today.     OBJECTIVE:  - See prenatal vitals flow  -   Vitals:    18 1353   BP: 122/72   Weight: 78.9 kg (174 lb)                 ASSESSMENT:   - IUP at 37w3d    - S<D   -   Patient Active Problem List    Diagnosis Date Noted   • Supervision of normal pregnancy in third trimester 10/02/2018         PLAN:  - S/sx pregnancy and labor warning signs vs general discomforts discussed  - Fetal movements and kick counts reviewed   - Adequate hydration reinforced  - Nutrition/exercise/vitamin education; continued PNV  - Plans for breastfeeding:handout given and reviewed   - Plans unsure for contraception Pp: handout given and reviewed  - Coping with labor pains reviewed and resources provided   - S/p TDAP vacc   - S/p Flu vacc today  - Anticipatory guidance given  - RTC in 1 weeks for follow-up prenatal care  - US for S<D

## 2018-11-07 ENCOUNTER — APPOINTMENT (OUTPATIENT)
Dept: RADIOLOGY | Facility: IMAGING CENTER | Age: 23
End: 2018-11-07
Attending: NURSE PRACTITIONER

## 2018-11-07 DIAGNOSIS — Z3A.37 37 WEEKS GESTATION OF PREGNANCY: ICD-10-CM

## 2018-11-07 PROBLEM — O36.5930 POOR FETAL GROWTH AFFECTING MANAGEMENT OF MOTHER IN THIRD TRIMESTER: Status: ACTIVE | Noted: 2018-11-07

## 2018-11-07 PROCEDURE — 99999 US-OB LIMITED GROWTH FOLLOW UP: CPT | Mod: 26 | Performed by: NURSE PRACTITIONER

## 2018-11-07 PROCEDURE — 76816 OB US FOLLOW-UP PER FETUS: CPT | Performed by: OBSTETRICS & GYNECOLOGY

## 2018-11-13 ENCOUNTER — ROUTINE PRENATAL (OUTPATIENT)
Dept: OBGYN | Facility: CLINIC | Age: 23
End: 2018-11-13

## 2018-11-13 ENCOUNTER — HOSPITAL ENCOUNTER (INPATIENT)
Facility: MEDICAL CENTER | Age: 23
LOS: 4 days | End: 2018-11-17
Attending: OBSTETRICS & GYNECOLOGY | Admitting: OBSTETRICS & GYNECOLOGY
Payer: MEDICAID

## 2018-11-13 VITALS — WEIGHT: 174 LBS | DIASTOLIC BLOOD PRESSURE: 74 MMHG | BODY MASS INDEX: 28.96 KG/M2 | SYSTOLIC BLOOD PRESSURE: 118 MMHG

## 2018-11-13 DIAGNOSIS — O36.5930 POOR FETAL GROWTH AFFECTING MANAGEMENT OF MOTHER IN THIRD TRIMESTER, SINGLE OR UNSPECIFIED FETUS: ICD-10-CM

## 2018-11-13 DIAGNOSIS — O28.3 ABNORMAL PREGNANCY US: ICD-10-CM

## 2018-11-13 DIAGNOSIS — Z98.891 S/P EMERGENCY CESAREAN SECTION: ICD-10-CM

## 2018-11-13 LAB
BASOPHILS # BLD AUTO: 0.3 % (ref 0–1.8)
BASOPHILS # BLD: 0.03 K/UL (ref 0–0.12)
EOSINOPHIL # BLD AUTO: 0.08 K/UL (ref 0–0.51)
EOSINOPHIL NFR BLD: 0.8 % (ref 0–6.9)
ERYTHROCYTE [DISTWIDTH] IN BLOOD BY AUTOMATED COUNT: 38.8 FL (ref 35.9–50)
ERYTHROCYTE [DISTWIDTH] IN BLOOD BY AUTOMATED COUNT: 38.9 FL (ref 35.9–50)
HCT VFR BLD AUTO: 29.9 % (ref 37–47)
HCT VFR BLD AUTO: 35.5 % (ref 37–47)
HGB BLD-MCNC: 11.6 G/DL (ref 12–16)
HGB BLD-MCNC: 9.2 G/DL (ref 12–16)
HOLDING TUBE BB 8507: NORMAL
IMM GRANULOCYTES # BLD AUTO: 0.06 K/UL (ref 0–0.11)
IMM GRANULOCYTES NFR BLD AUTO: 0.6 % (ref 0–0.9)
LYMPHOCYTES # BLD AUTO: 2.02 K/UL (ref 1–4.8)
LYMPHOCYTES NFR BLD: 21 % (ref 22–41)
MCH RBC QN AUTO: 24.3 PG (ref 27–33)
MCH RBC QN AUTO: 25.8 PG (ref 27–33)
MCHC RBC AUTO-ENTMCNC: 30.8 G/DL (ref 33.6–35)
MCHC RBC AUTO-ENTMCNC: 32.7 G/DL (ref 33.6–35)
MCV RBC AUTO: 78.9 FL (ref 81.4–97.8)
MCV RBC AUTO: 79.1 FL (ref 81.4–97.8)
MONOCYTES # BLD AUTO: 0.88 K/UL (ref 0–0.85)
MONOCYTES NFR BLD AUTO: 9.1 % (ref 0–13.4)
NEUTROPHILS # BLD AUTO: 6.55 K/UL (ref 2–7.15)
NEUTROPHILS NFR BLD: 68.2 % (ref 44–72)
NRBC # BLD AUTO: 0 K/UL
NRBC BLD-RTO: 0 /100 WBC
NST ACOUSTIC STIMULATION: NORMAL
NST ACTION NECESSARY: NORMAL
NST ASSESSMENT: NORMAL
NST BASELINE: NORMAL
NST INDICATIONS: NORMAL
NST OTHER DATA: NORMAL
NST READ BY: NORMAL
NST RETURN: NORMAL
NST UTERINE ACTIVITY: NORMAL
PATHOLOGY CONSULT NOTE: NORMAL
PLATELET # BLD AUTO: 275 K/UL (ref 164–446)
PLATELET # BLD AUTO: 298 K/UL (ref 164–446)
PMV BLD AUTO: 10.1 FL (ref 9–12.9)
PMV BLD AUTO: 10.3 FL (ref 9–12.9)
RBC # BLD AUTO: 3.79 M/UL (ref 4.2–5.4)
RBC # BLD AUTO: 4.49 M/UL (ref 4.2–5.4)
WBC # BLD AUTO: 16.4 K/UL (ref 4.8–10.8)
WBC # BLD AUTO: 9.6 K/UL (ref 4.8–10.8)

## 2018-11-13 PROCEDURE — 304964 HCHG RECOVERY ROOM TIME 1HR: Performed by: OBSTETRICS & GYNECOLOGY

## 2018-11-13 PROCEDURE — 85025 COMPLETE CBC W/AUTO DIFF WBC: CPT

## 2018-11-13 PROCEDURE — 700111 HCHG RX REV CODE 636 W/ 250 OVERRIDE (IP)

## 2018-11-13 PROCEDURE — 770002 HCHG ROOM/CARE - OB PRIVATE (112)

## 2018-11-13 PROCEDURE — 90040 PR PRENATAL FOLLOW UP: CPT | Performed by: NURSE PRACTITIONER

## 2018-11-13 PROCEDURE — 305385 HCHG SURGICAL SERVICES 1/4 HOUR: Performed by: OBSTETRICS & GYNECOLOGY

## 2018-11-13 PROCEDURE — 88307 TISSUE EXAM BY PATHOLOGIST: CPT

## 2018-11-13 PROCEDURE — 700105 HCHG RX REV CODE 258

## 2018-11-13 PROCEDURE — 4A1HXCZ MONITORING OF PRODUCTS OF CONCEPTION, CARDIAC RATE, EXTERNAL APPROACH: ICD-10-PCS | Performed by: OBSTETRICS & GYNECOLOGY

## 2018-11-13 PROCEDURE — 306828 HCHG ANES-TIME GENERAL: Performed by: OBSTETRICS & GYNECOLOGY

## 2018-11-13 PROCEDURE — 700111 HCHG RX REV CODE 636 W/ 250 OVERRIDE (IP): Performed by: ANESTHESIOLOGY

## 2018-11-13 PROCEDURE — 59514 CESAREAN DELIVERY ONLY: CPT | Performed by: OBSTETRICS & GYNECOLOGY

## 2018-11-13 PROCEDURE — 85027 COMPLETE CBC AUTOMATED: CPT

## 2018-11-13 PROCEDURE — 700102 HCHG RX REV CODE 250 W/ 637 OVERRIDE(OP): Performed by: ANESTHESIOLOGY

## 2018-11-13 PROCEDURE — A9270 NON-COVERED ITEM OR SERVICE: HCPCS | Performed by: ANESTHESIOLOGY

## 2018-11-13 PROCEDURE — 36415 COLL VENOUS BLD VENIPUNCTURE: CPT

## 2018-11-13 PROCEDURE — 700101 HCHG RX REV CODE 250

## 2018-11-13 PROCEDURE — 59025 FETAL NON-STRESS TEST: CPT | Performed by: OBSTETRICS & GYNECOLOGY

## 2018-11-13 PROCEDURE — 90040 PR PRENATAL FOLLOW UP: CPT | Performed by: OBSTETRICS & GYNECOLOGY

## 2018-11-13 PROCEDURE — 59514 CESAREAN DELIVERY ONLY: CPT

## 2018-11-13 RX ORDER — HYDROMORPHONE HYDROCHLORIDE 1 MG/ML
0.2 INJECTION, SOLUTION INTRAMUSCULAR; INTRAVENOUS; SUBCUTANEOUS
Status: DISCONTINUED | OUTPATIENT
Start: 2018-11-13 | End: 2018-11-14

## 2018-11-13 RX ORDER — ACETAMINOPHEN 500 MG
1000 TABLET ORAL EVERY 6 HOURS
Status: DISCONTINUED | OUTPATIENT
Start: 2018-11-13 | End: 2018-11-14

## 2018-11-13 RX ORDER — OXYCODONE HYDROCHLORIDE 10 MG/1
10 TABLET ORAL EVERY 4 HOURS PRN
Status: DISCONTINUED | OUTPATIENT
Start: 2018-11-13 | End: 2018-11-14

## 2018-11-13 RX ORDER — VITAMIN A ACETATE, BETA CAROTENE, ASCORBIC ACID, CHOLECALCIFEROL, .ALPHA.-TOCOPHEROL ACETATE, DL-, THIAMINE MONONITRATE, RIBOFLAVIN, NIACINAMIDE, PYRIDOXINE HYDROCHLORIDE, FOLIC ACID, CYANOCOBALAMIN, CALCIUM CARBONATE, FERROUS FUMARATE, ZINC OXIDE, CUPRIC OXIDE 3080; 12; 120; 400; 1; 1.84; 3; 20; 22; 920; 25; 200; 27; 10; 2 [IU]/1; UG/1; MG/1; [IU]/1; MG/1; MG/1; MG/1; MG/1; MG/1; [IU]/1; MG/1; MG/1; MG/1; MG/1; MG/1
1 TABLET, FILM COATED ORAL EVERY MORNING
Status: DISCONTINUED | OUTPATIENT
Start: 2018-11-13 | End: 2018-11-14

## 2018-11-13 RX ORDER — DIPHENHYDRAMINE HCL 25 MG
25 TABLET ORAL EVERY 6 HOURS PRN
Status: DISCONTINUED | OUTPATIENT
Start: 2018-11-13 | End: 2018-11-14

## 2018-11-13 RX ORDER — SODIUM CHLORIDE, SODIUM GLUCONATE, SODIUM ACETATE, POTASSIUM CHLORIDE AND MAGNESIUM CHLORIDE 526; 502; 368; 37; 30 MG/100ML; MG/100ML; MG/100ML; MG/100ML; MG/100ML
1500 INJECTION, SOLUTION INTRAVENOUS ONCE
Status: DISCONTINUED | OUTPATIENT
Start: 2018-11-13 | End: 2018-11-13 | Stop reason: HOSPADM

## 2018-11-13 RX ORDER — HYDRALAZINE HYDROCHLORIDE 20 MG/ML
5 INJECTION INTRAMUSCULAR; INTRAVENOUS
Status: CANCELLED | OUTPATIENT
Start: 2018-11-13

## 2018-11-13 RX ORDER — KETOROLAC TROMETHAMINE 30 MG/ML
30 INJECTION, SOLUTION INTRAMUSCULAR; INTRAVENOUS EVERY 6 HOURS
Status: DISCONTINUED | OUTPATIENT
Start: 2018-11-13 | End: 2018-11-14

## 2018-11-13 RX ORDER — CITRIC ACID/SODIUM CITRATE 334-500MG
30 SOLUTION, ORAL ORAL ONCE
Status: COMPLETED | OUTPATIENT
Start: 2018-11-13 | End: 2018-11-13

## 2018-11-13 RX ORDER — METOCLOPRAMIDE HYDROCHLORIDE 5 MG/ML
10 INJECTION INTRAMUSCULAR; INTRAVENOUS ONCE
Status: COMPLETED | OUTPATIENT
Start: 2018-11-13 | End: 2018-11-13

## 2018-11-13 RX ORDER — DOCUSATE SODIUM 100 MG/1
100 CAPSULE, LIQUID FILLED ORAL 2 TIMES DAILY PRN
Status: DISCONTINUED | OUTPATIENT
Start: 2018-11-13 | End: 2018-11-17 | Stop reason: HOSPADM

## 2018-11-13 RX ORDER — OXYCODONE HCL 5 MG/5 ML
5 SOLUTION, ORAL ORAL
Status: CANCELLED | OUTPATIENT
Start: 2018-11-13

## 2018-11-13 RX ORDER — SODIUM CHLORIDE, SODIUM LACTATE, POTASSIUM CHLORIDE, CALCIUM CHLORIDE 600; 310; 30; 20 MG/100ML; MG/100ML; MG/100ML; MG/100ML
INJECTION, SOLUTION INTRAVENOUS PRN
Status: DISCONTINUED | OUTPATIENT
Start: 2018-11-13 | End: 2018-11-17 | Stop reason: HOSPADM

## 2018-11-13 RX ORDER — DIPHENHYDRAMINE HYDROCHLORIDE 50 MG/ML
12.5 INJECTION INTRAMUSCULAR; INTRAVENOUS
Status: CANCELLED | OUTPATIENT
Start: 2018-11-13

## 2018-11-13 RX ORDER — HALOPERIDOL 5 MG/ML
1 INJECTION INTRAMUSCULAR
Status: CANCELLED | OUTPATIENT
Start: 2018-11-13

## 2018-11-13 RX ORDER — OXYCODONE HCL 5 MG/5 ML
10 SOLUTION, ORAL ORAL
Status: CANCELLED | OUTPATIENT
Start: 2018-11-13

## 2018-11-13 RX ORDER — ONDANSETRON 2 MG/ML
4 INJECTION INTRAMUSCULAR; INTRAVENOUS EVERY 6 HOURS PRN
Status: DISCONTINUED | OUTPATIENT
Start: 2018-11-13 | End: 2018-11-14

## 2018-11-13 RX ORDER — BISACODYL 10 MG
10 SUPPOSITORY, RECTAL RECTAL PRN
Status: DISCONTINUED | OUTPATIENT
Start: 2018-11-13 | End: 2018-11-17 | Stop reason: HOSPADM

## 2018-11-13 RX ORDER — SODIUM CHLORIDE, SODIUM LACTATE, POTASSIUM CHLORIDE, CALCIUM CHLORIDE 600; 310; 30; 20 MG/100ML; MG/100ML; MG/100ML; MG/100ML
INJECTION, SOLUTION INTRAVENOUS
Status: COMPLETED
Start: 2018-11-13 | End: 2018-11-14

## 2018-11-13 RX ORDER — OXYCODONE HYDROCHLORIDE 5 MG/1
5 TABLET ORAL EVERY 4 HOURS PRN
Status: DISCONTINUED | OUTPATIENT
Start: 2018-11-13 | End: 2018-11-14

## 2018-11-13 RX ORDER — OXYCODONE HYDROCHLORIDE 5 MG/1
5 TABLET ORAL
Status: CANCELLED | OUTPATIENT
Start: 2018-11-13

## 2018-11-13 RX ORDER — OXYCODONE HYDROCHLORIDE 10 MG/1
10 TABLET ORAL
Status: CANCELLED | OUTPATIENT
Start: 2018-11-13

## 2018-11-13 RX ORDER — ONDANSETRON 2 MG/ML
4 INJECTION INTRAMUSCULAR; INTRAVENOUS
Status: CANCELLED | OUTPATIENT
Start: 2018-11-13

## 2018-11-13 RX ORDER — HYDROMORPHONE HYDROCHLORIDE 1 MG/ML
0.2 INJECTION, SOLUTION INTRAMUSCULAR; INTRAVENOUS; SUBCUTANEOUS
Status: CANCELLED | OUTPATIENT
Start: 2018-11-13

## 2018-11-13 RX ORDER — DIPHENHYDRAMINE HCL 25 MG
25 TABLET ORAL EVERY 6 HOURS PRN
Status: DISCONTINUED | OUTPATIENT
Start: 2018-11-13 | End: 2018-11-17 | Stop reason: HOSPADM

## 2018-11-13 RX ORDER — KETOROLAC TROMETHAMINE 30 MG/ML
30 INJECTION, SOLUTION INTRAMUSCULAR; INTRAVENOUS EVERY 6 HOURS
Status: CANCELLED | OUTPATIENT
Start: 2018-11-13 | End: 2018-11-14

## 2018-11-13 RX ORDER — SODIUM CHLORIDE, SODIUM GLUCONATE, SODIUM ACETATE, POTASSIUM CHLORIDE AND MAGNESIUM CHLORIDE 526; 502; 368; 37; 30 MG/100ML; MG/100ML; MG/100ML; MG/100ML; MG/100ML
INJECTION, SOLUTION INTRAVENOUS
Status: COMPLETED
Start: 2018-11-13 | End: 2018-11-13

## 2018-11-13 RX ORDER — HYDROMORPHONE HYDROCHLORIDE 1 MG/ML
0.1 INJECTION, SOLUTION INTRAMUSCULAR; INTRAVENOUS; SUBCUTANEOUS
Status: CANCELLED | OUTPATIENT
Start: 2018-11-13

## 2018-11-13 RX ORDER — HYDROMORPHONE HYDROCHLORIDE 1 MG/ML
0.4 INJECTION, SOLUTION INTRAMUSCULAR; INTRAVENOUS; SUBCUTANEOUS
Status: CANCELLED | OUTPATIENT
Start: 2018-11-13

## 2018-11-13 RX ORDER — ACETAMINOPHEN 325 MG/1
975 TABLET ORAL ONCE
Status: CANCELLED | OUTPATIENT
Start: 2018-11-13 | End: 2018-11-14

## 2018-11-13 RX ORDER — SIMETHICONE 80 MG
80 TABLET,CHEWABLE ORAL 4 TIMES DAILY PRN
Status: DISCONTINUED | OUTPATIENT
Start: 2018-11-13 | End: 2018-11-17 | Stop reason: HOSPADM

## 2018-11-13 RX ORDER — LABETALOL HYDROCHLORIDE 5 MG/ML
5 INJECTION, SOLUTION INTRAVENOUS
Status: CANCELLED | OUTPATIENT
Start: 2018-11-13

## 2018-11-13 RX ORDER — SODIUM CHLORIDE, SODIUM LACTATE, POTASSIUM CHLORIDE, CALCIUM CHLORIDE 600; 310; 30; 20 MG/100ML; MG/100ML; MG/100ML; MG/100ML
INJECTION, SOLUTION INTRAVENOUS CONTINUOUS
Status: CANCELLED | OUTPATIENT
Start: 2018-11-13

## 2018-11-13 RX ADMIN — ACETAMINOPHEN 1000 MG: 500 TABLET ORAL at 20:47

## 2018-11-13 RX ADMIN — SODIUM CHLORIDE, POTASSIUM CHLORIDE, SODIUM LACTATE AND CALCIUM CHLORIDE 1000 ML: 600; 310; 30; 20 INJECTION, SOLUTION INTRAVENOUS at 11:29

## 2018-11-13 RX ADMIN — OXYTOCIN 20 UNITS: 10 INJECTION, SOLUTION INTRAMUSCULAR; INTRAVENOUS at 15:17

## 2018-11-13 RX ADMIN — SODIUM CITRATE AND CITRIC ACID MONOHYDRATE 30 ML: 500; 334 SOLUTION ORAL at 12:45

## 2018-11-13 RX ADMIN — KETOROLAC TROMETHAMINE 30 MG: 30 INJECTION, SOLUTION INTRAMUSCULAR at 20:47

## 2018-11-13 RX ADMIN — METOCLOPRAMIDE 10 MG: 5 INJECTION, SOLUTION INTRAMUSCULAR; INTRAVENOUS at 13:04

## 2018-11-13 RX ADMIN — FAMOTIDINE 20 MG: 10 INJECTION INTRAVENOUS at 13:05

## 2018-11-13 ASSESSMENT — PATIENT HEALTH QUESTIONNAIRE - PHQ9
SUM OF ALL RESPONSES TO PHQ9 QUESTIONS 1 AND 2: 0
1. LITTLE INTEREST OR PLEASURE IN DOING THINGS: NOT AT ALL
1. LITTLE INTEREST OR PLEASURE IN DOING THINGS: NOT AT ALL
SUM OF ALL RESPONSES TO PHQ9 QUESTIONS 1 AND 2: 0
2. FEELING DOWN, DEPRESSED, IRRITABLE, OR HOPELESS: NOT AT ALL
2. FEELING DOWN, DEPRESSED, IRRITABLE, OR HOPELESS: NOT AT ALL

## 2018-11-13 ASSESSMENT — PAIN SCALES - GENERAL
PAINLEVEL_OUTOF10: 0
PAINLEVEL_OUTOF10: 2
PAINLEVEL_OUTOF10: 2

## 2018-11-13 ASSESSMENT — LIFESTYLE VARIABLES
EVER_SMOKED: NEVER
ALCOHOL_USE: NO

## 2018-11-13 NOTE — PROGRESS NOTES
0930: Report received from Fan GOMEZ. Pt ambulatory to room. EFM and TOCO placed. She denies UC's, LOF, or VB at this time. Pt does reports FM, but feels that it is less than usual. 0955: Per Dr. Gaston Pt will be admitted for delivery. 1005: Dr. Pantoja at the bedside. Cervix is closed/thick/high. 1205: Dr. Gaston at the bedside for US. US shows baby is vertex. Pt further wedged to left side after decel and LR bolus started. 1215: C/S decided. 1338: Delivery of viable male APGARS 8/8. 1530: Pt taken to PPU via gurney to 331. Report given to Riya GOMEZ.

## 2018-11-13 NOTE — PROGRESS NOTES
NST - Not reactive with spontaneous decels - sending to hospital for further monitoring and likely delivery

## 2018-11-13 NOTE — OP REPORT
Operative Report  Date of Service: 18      PreOp Diagnosis:   1. SIUP @ 38w3d  2. IUGR  3. NRFHTs    PostOp Diagnosis:   S/p primary LTCS    Procedure(s):  PRIMARY C SECTION - Wound Class: Clean Contaminated    Surgeon(s):  Rina Gaston M.D.    Indication: 23-year-old  010 at 38 weeks and 3 days with known IUGR was sent from the office where she had spontaneous decelerations noted on nonstress test.  Patient was admitted for delivery secondary to known IUGR and spontaneous fetal heart rate decelerations at 38+ weeks.  On labor, however, the patient continued to have spontaneous decelerations that relate appearing as well as mild variables.  The pt was counseled on recommendation for primary LTCS secondary to NRFHTs which she accepted.      Anesthesiologist/Type of Anesthesia:  Anesthesiologist: Janette Corrigan M.D./Spinal    Surgical Staff:  Circulator: Mikaela Bradley R.N.  Scrub Person: Mague Tate  L&DEXTER Baby  Nurse: Zuri Weaver R.N.    Specimens removed if any:  Placenta sent to pathology  Umbilical cord gases sent    Estimated Blood Loss: 850cc  IVF: 1000cc LR  UOP: 800cc    Findings:   Vigorous male infant, vertex presentation; AGPARs 8/8, wt 2535g.  Long umbilical cord, body cord x1  Placenta grossly normal in appearance with 3VC  Normal uterus, tubes, and ovaries    Complications: none    Procedure in Detail: The pt ws taken to the operating room where spinal anesthesia was placed and found to be adequate.  The patient was prepped and draped in a normal supine position with a wedge under the right hip.  A pfannensteil incision was made and taken down to the fascia with the scalpel, which was incised bilaterally with the scalpel.  The fascial incision was extended laterally with the Garza scissors.  The rectus muscles were dissected off the rectus sheath with Garza scissors superiorly and inferiorly.  There was separation of the rectus sheath superiorly and the  peritoneum was entered bluntly, extended with stretching.  The bladder blade was placed.  The lower uterine segment was incised transversely and the endometrial cavity entered with a blunt finger, clear fluid was noted.  The hysterotomy was extended with cephalad-caudad stretching.  The infant's head was manually elevated to the level of the hysterotomy and delivered, followed atraumatically by the anterior shoulder, posterior shoulder and body with help of fundal pressure.  The infant was stimulated, bulb suctioned, and cord clamping was delayed x60 seconds then the cord was clamped x2, cut and the infant handed to nurse.  Cord gases were sent.  The placenta delivered with gentle cord traction and uterine massage and was noted to be intact with 3-vessel cord. The uterus was exteriorized, cleared of all clots and debris and closed in 2 layers with 0-monocryl.  A single figure of 8 was required over midline and hemostasis assured.  The uterus was returned to the abdomen, the gutters cleared of all clots and debris and the hysterotomy re-examined and noted to be hemostatic.  The rectus muscles were inspected, found to be hemostatic.  The fascia was closed with 0 Vicryl.  The subcutaneous tissues was irrigated. Bleeders were coagulated with the bovie.  The subcutaneous tissues were approximated with running 2-0 plain and the skin with subcuticular suture with 4-0 monocryl.  Dermabond was placed, let dry and followed by pressure dressing.  All counts were correct.  Pt went to recovery in good condition.  Infant went to  nursery.        Rangel Gaston M.D.

## 2018-11-13 NOTE — PROGRESS NOTES
"OB H&P:    CC: decelerations on NST    HPI:  Ms. Tony Foreman is a 23 y.o.  @ 38w3d by lmp c/w 28wk US (late entry to PNC) with IUGR diagnosed at 37-week ultrasound with baby measured 2510 g with EFW in the 6%.  Patient was seen for routine NST today at [facility], was noted to have spontaneous decelerations and was sent immediately to labor for delivery.    Patient reports feeling well and denies concerns.  Endorses positive fetal movement.    Contractions: No   Loss of fluid: No   Vaginal bleeding: No   Fetal movement: +        PNL:  Rh/-, RI, HIV neg, RPR NR, HBsAg NR, GC/CT neg/neg  GBS neg      ROS:  Const: denies fevers, general concerns  CV/resp: reports no concerns  GI: denies abd pain, GI concerns  : see HPI  Neuro: Reports no HA/vision changes    OB History    Para Term  AB Living   2       1     SAB TAB Ectopic Molar Multiple Live Births   1                # Outcome Date GA Lbr Gurvinder/2nd Weight Sex Delivery Anes PTL Lv   2 Current            1 SAB 2018        FD          GYN: denies STIs    PMhx: denies  PSHx; denies    No current facility-administered medications on file prior to encounter.      Current Outpatient Prescriptions on File Prior to Encounter   Medication Sig Dispense Refill   • Prenatal MV-Min-Fe Fum-FA-DHA (PRENATAL 1 PO) Take  by mouth.         Family History   Problem Relation Age of Onset   • Diabetes Mother    • Hypertension Mother    • Diabetes Father    • Hypertension Father        Social History Main Topics   • Smoking status: Never Smoker   • Smokeless tobacco: Never Used   • Alcohol use No   • Drug use: No   • Sexual activity: Yes     Partners: Male     Birth control/ protection: Condom      Comment: none       PE:  Vitals:    18 0935   BP: 122/85   Pulse: (!) 104   Temp: 37.3 °C (99.2 °F)   TempSrc: Temporal   Weight: 78.9 kg (174 lb)   Height: 1.651 m (5' 5\")     gen: AAO, NAD  abd: soft, gravid, NT  Ext: NT, no edema    SVE: cl/th/hi per resident  FHT: " 140/mod variability/+ accels/ + spontaneous variabale and late decels  [place]: rare ctx    A/P: 23 y.o.  @ 38w3d by lmp c/w 28wk US with IUGR, cat II FHTs  Discussed with patient indication for delivery given category 2 heart tracing gestational age greater than 38 weeks and fetal growth restriction.  Discussed option of trying for vaginal delivery versus  delivery.  If attempting vaginal delivery discussed with utilized contraction stress test prior to cervical ripening.  During our conversation however, patient had 1 contraction and additional spontaneous late decelerations lasting approximately 3 minutes.  Discussed given inability to perform CST secondary to category 2 heart rate tracing and intolerance of spontaneous contractions, recommend primary  delivery.  Patient accepting of plan of care.  Asked about ability to try for vaginal delivery in future pregnancies.  Discussed that the type of  we plan to do would not preclude her from trying for vaginal delivery in future pregnancy should she desire to do so.  I discussed w/ pt risks of surgery including pain, bleeding, infection, risk of damage to intraabdominal structures including bowel/bladder/ureters.  Pt confirms she is accepting of blood transfusion in case of emergency.  Reviewed risks of transfusion including transfusion reaction (fever, damage to heart/lungs/kidneys), risk of exposure to infectious disease including HIV and hepatitis.  All questions answered.  Consent signed      Rina Gaston MD  [facility], Women's Health

## 2018-11-13 NOTE — OR SURGEON
Immediate Post OP Note    PreOp Diagnosis:   1. SIUP @ 38w3d  2. IUGR  3. NRFHTs    PostOp Diagnosis:   S/p primary LTCS    Procedure(s):  PRIMARY C SECTION - Wound Class: Clean Contaminated    Surgeon(s):  Rina Gaston M.D.    Anesthesiologist/Type of Anesthesia:  Anesthesiologist: Janette Corrigan M.D./Spinal    Surgical Staff:  Circulator: Mikaela Bradley R.N.  Scrub Person: Mague CHILDERS&DEXTER Baby  Nurse: Zuri Weaver R.N.    Specimens removed if any:  Placenta sent to pathology  Umbilical cord gases sent    Estimated Blood Loss: 850cc    Findings:   Vigorous male infant, vertex presentation; AGPARs 8/8, wt 2535g.  Long umbilical cord, body cord x1  Placenta grossly normal in appearance with 3VC  Normal uterus, tubes, and ovaries    Complications: none        11/13/2018 3:08 PM Rina Gaston M.D.

## 2018-11-13 NOTE — PROGRESS NOTES
SUBJECTIVE:  Pt is a 23 y.o.   at 38w3d  gestation. Presents today for follow-up prenatal care. Reports no issues at this time.  Reports good  fetal movement. Denies cramping/contractions, bleeding or leaking of fluid. Denies dysuria, headaches, N/V, or other issues at this time. Generally feels well today. Reporting cramping at night.     OBJECTIVE:  - See prenatal vitals flow  -   Vitals:    18 0836   BP: 118/74   Weight: 78.9 kg (174 lb)                 ASSESSMENT:   - IUP at 38w3d    - S=D   -   Patient Active Problem List    Diagnosis Date Noted   • Poor fetal growth affecting management of mother in third trimester 2018   • Supervision of normal pregnancy in third trimester 10/02/2018         PLAN:  - S/sx pregnancy and labor warning signs vs general discomforts discussed  - Fetal movements and kick counts reviewed   - Adequate hydration reinforced  - Nutrition/exercise/vitamin education; continued PNV  - NST now and Friday  - IOL at 39 weeks   - F/u US with dopplers ASAP   - S/p TDAP vacc   - S/p Flu vacc   - Anticipatory guidance given  - RTC in 1 weeks for follow-up prenatal care in case IOL still pending

## 2018-11-13 NOTE — PROGRESS NOTES
Pt. Here for OB/FU today. Reports Good FM.   Good # [phone]  Pt states having contractions at night.   Pharmacy verified.

## 2018-11-13 NOTE — CARE PLAN
Problem: Pain  Goal: Alleviation of Pain or a reduction in pain to the patient's comfort goal    Intervention: Initial pain assessment  Pt denies any pain at this time. She states she does plan on an epidural later into her labor.         Problem: Risk for Infection, Impaired Wound Healing  Goal: Remain free from signs and symptoms of infection    Intervention: Infection prevention measures  No S/S of infection at this time. Pt is afebrile, WBC's are WNL, MEWS is WNL. Limited SVEs.

## 2018-11-14 PROCEDURE — A9270 NON-COVERED ITEM OR SERVICE: HCPCS | Performed by: ANESTHESIOLOGY

## 2018-11-14 PROCEDURE — A9270 NON-COVERED ITEM OR SERVICE: HCPCS | Performed by: OBSTETRICS & GYNECOLOGY

## 2018-11-14 PROCEDURE — 700102 HCHG RX REV CODE 250 W/ 637 OVERRIDE(OP): Performed by: ANESTHESIOLOGY

## 2018-11-14 PROCEDURE — 770002 HCHG ROOM/CARE - OB PRIVATE (112)

## 2018-11-14 PROCEDURE — 700111 HCHG RX REV CODE 636 W/ 250 OVERRIDE (IP): Performed by: ANESTHESIOLOGY

## 2018-11-14 PROCEDURE — 700102 HCHG RX REV CODE 250 W/ 637 OVERRIDE(OP): Performed by: OBSTETRICS & GYNECOLOGY

## 2018-11-14 PROCEDURE — 700105 HCHG RX REV CODE 258: Performed by: OBSTETRICS & GYNECOLOGY

## 2018-11-14 RX ORDER — IBUPROFEN 600 MG/1
600 TABLET ORAL EVERY 6 HOURS PRN
Status: DISCONTINUED | OUTPATIENT
Start: 2018-11-14 | End: 2018-11-17 | Stop reason: HOSPADM

## 2018-11-14 RX ORDER — ACETAMINOPHEN 325 MG/1
650 TABLET ORAL EVERY 4 HOURS PRN
Status: DISCONTINUED | OUTPATIENT
Start: 2018-11-14 | End: 2018-11-17 | Stop reason: HOSPADM

## 2018-11-14 RX ORDER — VITAMIN A ACETATE, BETA CAROTENE, ASCORBIC ACID, CHOLECALCIFEROL, .ALPHA.-TOCOPHEROL ACETATE, DL-, THIAMINE MONONITRATE, RIBOFLAVIN, NIACINAMIDE, PYRIDOXINE HYDROCHLORIDE, FOLIC ACID, CYANOCOBALAMIN, CALCIUM CARBONATE, FERROUS FUMARATE, ZINC OXIDE, CUPRIC OXIDE 3080; 12; 120; 400; 1; 1.84; 3; 20; 22; 920; 25; 200; 27; 10; 2 [IU]/1; UG/1; MG/1; [IU]/1; MG/1; MG/1; MG/1; MG/1; MG/1; [IU]/1; MG/1; MG/1; MG/1; MG/1; MG/1
1 TABLET, FILM COATED ORAL EVERY MORNING
Status: DISCONTINUED | OUTPATIENT
Start: 2018-11-14 | End: 2018-11-17 | Stop reason: HOSPADM

## 2018-11-14 RX ADMIN — ACETAMINOPHEN 1000 MG: 500 TABLET ORAL at 02:32

## 2018-11-14 RX ADMIN — KETOROLAC TROMETHAMINE 30 MG: 30 INJECTION, SOLUTION INTRAMUSCULAR at 02:32

## 2018-11-14 RX ADMIN — ACETAMINOPHEN 1000 MG: 500 TABLET ORAL at 08:30

## 2018-11-14 RX ADMIN — IBUPROFEN 600 MG: 600 TABLET, FILM COATED ORAL at 22:15

## 2018-11-14 RX ADMIN — SODIUM CHLORIDE, POTASSIUM CHLORIDE, SODIUM LACTATE AND CALCIUM CHLORIDE: 600; 310; 30; 20 INJECTION, SOLUTION INTRAVENOUS at 02:34

## 2018-11-14 RX ADMIN — ACETAMINOPHEN 650 MG: 325 TABLET, FILM COATED ORAL at 15:30

## 2018-11-14 RX ADMIN — Medication 1 TABLET: at 09:00

## 2018-11-14 RX ADMIN — KETOROLAC TROMETHAMINE 30 MG: 30 INJECTION, SOLUTION INTRAMUSCULAR at 08:30

## 2018-11-14 ASSESSMENT — PAIN SCALES - GENERAL
PAINLEVEL_OUTOF10: 4
PAINLEVEL_OUTOF10: 2
PAINLEVEL_OUTOF10: 0
PAINLEVEL_OUTOF10: 5
PAINLEVEL_OUTOF10: 2
PAINLEVEL_OUTOF10: 0
PAINLEVEL_OUTOF10: 2
PAINLEVEL_OUTOF10: 5
PAINLEVEL_OUTOF10: 2

## 2018-11-14 NOTE — PROGRESS NOTES
Obstetrics & Gynecology Post-Delivery Progress Note    Date of Service  2018    23 y.o.  1 s/p , Low Transverse  d/t NRFHR, IUGR  Delivery date: 2018  Breastfeeding: Pt planning to breastfeed    Events  No events    Subjective  Pain: No  Bleeding: lochia minimal  PO's: taking clears  Voiding: without difficulty   GI: pt denies flatus  Ambulating: yes without dizziness or weakness  Feeding: Pt planning to breastfeed, baby in NICU so she wants to start pumping     Objective  Temp:  [36.4 °C (97.6 °F)-37.3 °C (99.2 °F)] 37 °C (98.6 °F)  Pulse:  [] 69  Resp:  [16-20] 18  BP: (107-136)/(65-87) 113/67    Physical Exam  General: well and resting  Chest/Breasts: nipples intact and breasts soft  Fundus: firm, below umbilicus and nontender  Incision: healing well, no significant drainage, no dehiscence and no significant erythema  Perineum: deferred  Extremities: no edema  GI: BS X 4, soft, nondistended    Lab Results   Component Value Date    RBC 3.79 (L) 2018    ABOGROUP B 10/15/2018    RH POS 10/15/2018     Immunization History   Administered Date(s) Administered   • Influenza Vaccine Quad Inj (Pf) 2018   • Tdap Vaccine 10/02/2018, 10/02/2018       Assessment/Plan  Tony Foreman is a 23 y.o.  postop day 1 s/p , Low Transverse .    1. Post care: meeting all goals  2. Hemodynamics: 9.2/29.9  3. Pain: controlled using Tylenol  4. PNL:   Lab Results   Component Value Date    RH POS 10/15/2018    RUBELLAIGG 16.90 10/15/2018     5. Method of Feeding: encouraged pt to start pumping ASAP  6. Method of Contraception: NA  7. Vaccinations:   Immunization History   Administered Date(s) Administered   • Influenza Vaccine Quad Inj (Pf) 2018   • Tdap Vaccine 10/02/2018, 10/02/2018     8. Disposition: likely home postop day 3-4, depending on baby's NICU status           VTE prophylaxis: Pt is ambulatory    Bridgette Ramachandran CNM, A.P.R.N.

## 2018-11-14 NOTE — DISCHARGE PLANNING
Discharge Planning Assessment Post Partum     Reason for Referral: NICU-38.3 weeks  Address: [address] [place] [address] [place] [address]  Phone: [phone]  Type of Living Situation: living with FOB  Mom Diagnosis: Pregnancy  Baby Diagnosis:   Primary Language: MOB speaks English     Name of Baby: Basil Bentley (: 18)  Father of the Baby: Tomás Bentley   Involved in baby’s care? Yes  Contact Information: [phone]     Prenatal Care: Yes  Mom's PCP: None  PCP for new baby: Pediatrician list provided     Support System: FOB  Coping/Bonding between mother & baby: Yes  Source of Feeding: breast feeding  Supplies for Infant: Prepared for infant.  Denies any needs.     Mom's Insurance: Medicaid  Baby Covered on Insurance:Yes  Mother Employed/School: Not currently  Other children in the home/names & ages: 1st baby     Financial Hardship/Income: FOB is employed at [employer]   Mom's Mental status: alert and oriented  Services used prior to admit: Medicaid     CPS History: No  Psychiatric History: No  Domestic Violence History: No  Drug/ETOH History: No     Resources Provided: Pediatrician list, children and family resource list, list of WIC locations, information to NICU Boot camp, counseling resource specializing in post partum depression  Referrals Made: diaper bank referral provided      Clearance for Discharge: Infant is cleared to discharge home with MOB once medically stable.     Ongoing Plan: SW will continue to follow and assist as needed.

## 2018-11-14 NOTE — LACTATION NOTE
This note was copied from a baby's chart.  Evaluated mothers use of breast pump to include frequency, duration, suction and speed settings.

## 2018-11-14 NOTE — PROGRESS NOTES
Pt admitted to room S331 via gurney with FOB, infant, and L&D RN. Report received from PATRICIA Al. VSS.  in use. SCDs in use. IS at bedside, pt demonstrates proper use. Denies pain at this time. Oriented to unit, room, infant sleeping policy, infant feeding policy, security policy, call light, skylight, emergency call light, and POC. FOB and pt express understanding. Call light within reach, encouraged to call for assistance.

## 2018-11-14 NOTE — CARE PLAN
Problem: Altered physiologic condition related to postoperative  delivery  Goal: Patient physiologically stable as evidenced by normal lochia, palpable uterine involution and vital signs within normal limits  Outcome: PROGRESSING AS EXPECTED  Fundus firm,lochia scant.    Problem: Potential for postpartum infection related to surgical incision, compromised uterine condition, urinary tract or respiratory compromise  Goal: Patient will be afebrile and free from signs and symptoms of infection  Outcome: PROGRESSING AS EXPECTED  Afebrile,no s/s of infection noted.Abdominal dressing clean,dry and intact,no drainage noted.    Problem: Alteration in comfort related to surgical incision and/or after birth pains  Goal: Patient is able to ambulate, care for self and infant with acceptable pain level  Outcome: PROGRESSING AS EXPECTED  Patient medicated with tylenol and toradol as ordered for incision pain with good results  as verbalized by patient.Assisted patient out of bed,ambulated to bathroom and in hallways without difficulty,tolerated well.

## 2018-11-14 NOTE — CARE PLAN
Problem: Altered physiologic condition related to postoperative  delivery  Goal: Patient physiologically stable as evidenced by normal lochia, palpable uterine involution and vital signs within normal limits  Outcome: PROGRESSING AS EXPECTED  Fundus firm, lochia light. Pt VSS and WNL.    Problem: Potential knowledge deficit related to lack of understanding of self and  care  Goal: Patient will verbalize understanding of self and infant care  Outcome: PROGRESSING AS EXPECTED  Pt actively involved in care of infant. Pt performing skin to skin and asking appropriate questions

## 2018-11-14 NOTE — PROGRESS NOTES
1735-Bedside report received from Riya Manriquez RN. Fundus firm one cm below umbilicus and lochia is light rubra. Alethea pad changed. Patient rates incisional pain 2 out of 10 and denies need for any additional pain medication at this time. Assumed care of patient.  1750-Infant placed skin to skin with patient. Patient assisted with positioning and latch on for breast feeding.  1940-Bedside report given to Tova GOMEZ who assumed care of patient.

## 2018-11-15 PROCEDURE — A9270 NON-COVERED ITEM OR SERVICE: HCPCS | Performed by: OBSTETRICS & GYNECOLOGY

## 2018-11-15 PROCEDURE — 700102 HCHG RX REV CODE 250 W/ 637 OVERRIDE(OP): Performed by: OBSTETRICS & GYNECOLOGY

## 2018-11-15 PROCEDURE — 700112 HCHG RX REV CODE 229: Performed by: OBSTETRICS & GYNECOLOGY

## 2018-11-15 PROCEDURE — 770002 HCHG ROOM/CARE - OB PRIVATE (112)

## 2018-11-15 RX ADMIN — SIMETHICONE 80 MG: 80 TABLET, CHEWABLE ORAL at 06:29

## 2018-11-15 RX ADMIN — ACETAMINOPHEN 650 MG: 325 TABLET, FILM COATED ORAL at 06:27

## 2018-11-15 RX ADMIN — IBUPROFEN 600 MG: 600 TABLET, FILM COATED ORAL at 06:27

## 2018-11-15 RX ADMIN — IBUPROFEN 600 MG: 600 TABLET, FILM COATED ORAL at 17:58

## 2018-11-15 RX ADMIN — ACETAMINOPHEN 650 MG: 325 TABLET, FILM COATED ORAL at 00:17

## 2018-11-15 RX ADMIN — Medication 1 TABLET: at 06:10

## 2018-11-15 RX ADMIN — DOCUSATE SODIUM 100 MG: 100 CAPSULE, LIQUID FILLED ORAL at 06:27

## 2018-11-15 ASSESSMENT — PAIN SCALES - GENERAL
PAINLEVEL_OUTOF10: 3
PAINLEVEL_OUTOF10: 3
PAINLEVEL_OUTOF10: 4
PAINLEVEL_OUTOF10: 2
PAINLEVEL_OUTOF10: 6
PAINLEVEL_OUTOF10: 5
PAINLEVEL_OUTOF10: 6
PAINLEVEL_OUTOF10: 3
PAINLEVEL_OUTOF10: 0
PAINLEVEL_OUTOF10: 2

## 2018-11-15 ASSESSMENT — EDINBURGH POSTNATAL DEPRESSION SCALE (EPDS)
I HAVE BEEN ANXIOUS OR WORRIED FOR NO GOOD REASON: YES, SOMETIMES
I HAVE BEEN SO UNHAPPY THAT I HAVE BEEN CRYING: YES, QUITE OFTEN
THINGS HAVE BEEN GETTING ON TOP OF ME: NO, MOST OF THE TIME I HAVE COPED QUITE WELL
I HAVE FELT SAD OR MISERABLE: NOT VERY OFTEN
I HAVE BEEN ABLE TO LAUGH AND SEE THE FUNNY SIDE OF THINGS: NOT QUITE SO MUCH NOW
I HAVE BEEN SO UNHAPPY THAT I HAVE HAD DIFFICULTY SLEEPING: NOT VERY OFTEN
I HAVE LOOKED FORWARD WITH ENJOYMENT TO THINGS: RATHER LESS THAN I USED TO
THE THOUGHT OF HARMING MYSELF HAS OCCURRED TO ME: HARDLY EVER
I HAVE BLAMED MYSELF UNNECESSARILY WHEN THINGS WENT WRONG: NOT VERY OFTEN
I HAVE FELT SCARED OR PANICKY FOR NO GOOD REASON: NO, NOT MUCH

## 2018-11-15 NOTE — CARE PLAN
Problem: Altered physiologic condition related to postoperative  delivery  Goal: Patient physiologically stable as evidenced by normal lochia, palpable uterine involution and vital signs within normal limits  Fundal massage done with scant bleeding    Problem: Alteration in comfort related to surgical incision and/or after birth pains  Goal: Patient is able to ambulate, care for self and infant with acceptable pain level  Outcome: PROGRESSING AS EXPECTED  Pain controlled

## 2018-11-15 NOTE — CARE PLAN
Problem: Pain  Goal: Alleviation of Pain or a reduction in pain to the patient's comfort goal  Outcome: PROGRESSING AS EXPECTED  Pt. States pain is low (3/10).  Pt declines any pain intervention at this time. Pt re-educated about use of call light if in need of assistance or pain increases.     Problem: Skin Integrity  Goal: Risk for impaired skin integrity will decrease  Outcome: PROGRESSING AS EXPECTED  Pt can ambulate independently and is rarely moist.  Pt shows no S/S of impaired skin integrity.

## 2018-11-15 NOTE — DISCHARGE PLANNING
Received another order stating MOB scored a 12 on the EPDS scale.  OLY met with MOB and provided her information to the NICU Bootcamp Support group as well as a list of counselors specializing in post partum depression.  Discussed with patient that she should follow up with her OB as needed.     OLY will continue to follow and check-in with MOB.

## 2018-11-15 NOTE — PROGRESS NOTES
0705- Received report from night RN, Infant in NICU.  Pt resting in bed. Discussed pain management for the day.  No further needs at the time.  Call light within reach, bed locked and in lowest position.  Rounding in place.

## 2018-11-15 NOTE — PROGRESS NOTES
2000 pt doing well, Assessment done wound open to air clean and dry, Denies pain at this time, Encourage more ambulation. Needs attended.

## 2018-11-15 NOTE — PROGRESS NOTES
S   22 y/o now  s/p primary c/s for NRFHT and IUGR of baby. No laceration. POD 2 with baby in NICU. Reports ambulating well, pain well controlled. Eating well, drinking well, has been urinating and passing gas. Bleeding is light. Has been pumping breastmilk for NICU.         O  See PE: Physical exam today with no abnormal findings  Vital signs WNL: BP and weight   H/H: 16.4>9.2/29.9<275    A  Reassuring exam of lactating postpartum woman s/p c/s on   Incision dry, intact, approximated without s/sx of infection     P  Routine post op care  Anticipate d/c POD 3

## 2018-11-16 PROCEDURE — A9270 NON-COVERED ITEM OR SERVICE: HCPCS | Performed by: NURSE PRACTITIONER

## 2018-11-16 PROCEDURE — 700102 HCHG RX REV CODE 250 W/ 637 OVERRIDE(OP): Performed by: NURSE PRACTITIONER

## 2018-11-16 PROCEDURE — 700102 HCHG RX REV CODE 250 W/ 637 OVERRIDE(OP): Performed by: OBSTETRICS & GYNECOLOGY

## 2018-11-16 PROCEDURE — 770002 HCHG ROOM/CARE - OB PRIVATE (112)

## 2018-11-16 PROCEDURE — A9270 NON-COVERED ITEM OR SERVICE: HCPCS | Performed by: OBSTETRICS & GYNECOLOGY

## 2018-11-16 RX ORDER — HYDROCODONE BITARTRATE AND ACETAMINOPHEN 10; 325 MG/1; MG/1
1 TABLET ORAL EVERY 4 HOURS PRN
Status: DISCONTINUED | OUTPATIENT
Start: 2018-11-16 | End: 2018-11-17 | Stop reason: HOSPADM

## 2018-11-16 RX ORDER — HYDROCODONE BITARTRATE AND ACETAMINOPHEN 5; 325 MG/1; MG/1
1 TABLET ORAL EVERY 4 HOURS PRN
Status: DISCONTINUED | OUTPATIENT
Start: 2018-11-16 | End: 2018-11-17 | Stop reason: HOSPADM

## 2018-11-16 RX ORDER — FERROUS SULFATE 325(65) MG
325 TABLET ORAL 2 TIMES DAILY WITH MEALS
Status: DISCONTINUED | OUTPATIENT
Start: 2018-11-16 | End: 2018-11-17 | Stop reason: HOSPADM

## 2018-11-16 RX ADMIN — HYDROCODONE BITARTRATE AND ACETAMINOPHEN 1 TABLET: 5; 325 TABLET ORAL at 02:21

## 2018-11-16 RX ADMIN — Medication 1 TABLET: at 09:57

## 2018-11-16 RX ADMIN — IBUPROFEN 600 MG: 600 TABLET, FILM COATED ORAL at 16:17

## 2018-11-16 RX ADMIN — IBUPROFEN 600 MG: 600 TABLET, FILM COATED ORAL at 02:21

## 2018-11-16 RX ADMIN — IBUPROFEN 600 MG: 600 TABLET, FILM COATED ORAL at 09:57

## 2018-11-16 RX ADMIN — FERROUS SULFATE TAB 325 MG (65 MG ELEMENTAL FE) 325 MG: 325 (65 FE) TAB at 17:57

## 2018-11-16 RX ADMIN — FERROUS SULFATE TAB 325 MG (65 MG ELEMENTAL FE) 325 MG: 325 (65 FE) TAB at 09:57

## 2018-11-16 ASSESSMENT — PAIN SCALES - GENERAL
PAINLEVEL_OUTOF10: 3
PAINLEVEL_OUTOF10: 4
PAINLEVEL_OUTOF10: 4
PAINLEVEL_OUTOF10: 3
PAINLEVEL_OUTOF10: 6
PAINLEVEL_OUTOF10: 3

## 2018-11-16 NOTE — PROGRESS NOTES
Progress Note    Tony Foreman   Post-op day 3  Chief Admitting Dx: Pregnancy  Indication for care in labor or delivery  Delivery Type:       Subjective:  Ambulating: yes  Tolerating oral feed: yes  Flatus: yes    Voiding: yes  Dizziness: no  Vitals:    18 1200 11/14/18 2000 11/15/18 0800 11/15/18 2000   BP: 115/71 113/70 119/77 120/82   Pulse: 70 75 99 82   Resp:    Temp: 36.7 °C (98 °F) 36.9 °C (98.4 °F) 37.1 °C (98.7 °F) 36.8 °C (98.3 °F)   TempSrc: Oral Temporal Temporal Temporal   SpO2: 98% 97% 98% 96%   Weight:       Height:           Exam:  Abdomen: Abdomen soft, non-tender. BS normal. No masses,  No organomegaly  Incision: dry and intact  Fundus Tenderness: no  Below umbilicus: yes  Perineum: perineum intact  Extremities: Normal  Lochia: mild    Labs:   No results found for this or any previous visit (from the past 24 hour(s)).    Assessment:  Continue Routine postpartum care  Desires DC home tomorrow as baby is in NICU.  Asx anemia - iron.  Plan DC home on POD#4    Plan:  Encourange Ambulation    SAM DardenNDEN.

## 2018-11-16 NOTE — CONSULTS
Mom's breasts filling, firm. Areola swollen with circular red marks at base of nipple which mom reports are from pumping. Observed mom pumping, nipples tight in flange and mom reports discomfort on suction of 20. Flange size changed to 28.5, mom states immediate improvement in comfort at suction of 25, rate of 80 to 60.  Mom has [address] [place] [address] WIC and paperwork given for parents to get a pump today from [facility]. Mom will continue with warm soaks and massaging prior to pumping and ice packs for 10 minutes after pumping.

## 2018-11-16 NOTE — PROGRESS NOTES
Received bedside report from PATRICIA Harris. Patient in bed, declines pain at this time. Patient aware of pharmacological options. Patient will call when needing pain medication. Patient pumping q 3 hrs, settings discussed.  POC discussed, whiteboards updated. Call light within reach. Patient encouraged to call with any needs and or concerns.

## 2018-11-16 NOTE — CARE PLAN
Problem: Altered physiologic condition related to postoperative  delivery  Goal: Patient physiologically stable as evidenced by normal lochia, palpable uterine involution and vital signs within normal limits  Outcome: PROGRESSING AS EXPECTED  VSS. Fundus firm with scant lochia. Patient educated on when to call MD and/or pull emergency call light.     Problem: Alteration in comfort related to surgical incision and/or after birth pains  Goal: Patient verbalizes acceptable pain level  Outcome: PROGRESSING AS EXPECTED  Patient will call when needing pain medication.

## 2018-11-16 NOTE — PROGRESS NOTES
0718- Bedside report received from PATRICIA Mata.  Patient denied needs.  Patient sitting up in bed, using breast pump at this time.  0933- Patient not in room.  0957- Patient assessment done.  Patient stated that she is voiding without difficulty and passing flatus.  Patient denied dizziness and stated that she is walking without difficulty.  Discussed pain management plan and patient prefers to call for pain intervention as needed.  Reviewed plan of care.  Patient instructed to ambulate in hallways.  Patient instructed to use incentive spirometer hourly.  Patient verbalized understanding.  Discussed with patient, the [place]  Depression Scale, and patient stated she has not had any recent thoughts of harming herself, but stated it was approximately two years ago  7531- Patient reported that she has reviewed the discharge education sheet with PATRICIA Mata.  Discussed with patient the signs/symptoms of postpartum blues/depression and warning signs of depression and when to call the MD.  Patient verbalized understanding.

## 2018-11-17 VITALS
RESPIRATION RATE: 18 BRPM | BODY MASS INDEX: 28.99 KG/M2 | OXYGEN SATURATION: 97 % | DIASTOLIC BLOOD PRESSURE: 75 MMHG | SYSTOLIC BLOOD PRESSURE: 124 MMHG | HEIGHT: 65 IN | TEMPERATURE: 97.6 F | HEART RATE: 97 BPM | WEIGHT: 174 LBS

## 2018-11-17 PROCEDURE — A9270 NON-COVERED ITEM OR SERVICE: HCPCS | Performed by: OBSTETRICS & GYNECOLOGY

## 2018-11-17 PROCEDURE — A9270 NON-COVERED ITEM OR SERVICE: HCPCS | Performed by: NURSE PRACTITIONER

## 2018-11-17 PROCEDURE — 700112 HCHG RX REV CODE 229: Performed by: OBSTETRICS & GYNECOLOGY

## 2018-11-17 PROCEDURE — 700102 HCHG RX REV CODE 250 W/ 637 OVERRIDE(OP): Performed by: NURSE PRACTITIONER

## 2018-11-17 PROCEDURE — 700102 HCHG RX REV CODE 250 W/ 637 OVERRIDE(OP): Performed by: OBSTETRICS & GYNECOLOGY

## 2018-11-17 RX ORDER — OXYCODONE HYDROCHLORIDE 5 MG/1
5 TABLET ORAL EVERY 4 HOURS PRN
Qty: 30 TAB | Refills: 0 | Status: SHIPPED | OUTPATIENT
Start: 2018-11-17 | End: 2018-11-24

## 2018-11-17 RX ADMIN — IBUPROFEN 600 MG: 600 TABLET, FILM COATED ORAL at 09:36

## 2018-11-17 RX ADMIN — Medication 1 TABLET: at 07:40

## 2018-11-17 RX ADMIN — HYDROCODONE BITARTRATE AND ACETAMINOPHEN 1 TABLET: 5; 325 TABLET ORAL at 00:00

## 2018-11-17 RX ADMIN — IBUPROFEN 600 MG: 600 TABLET, FILM COATED ORAL at 00:00

## 2018-11-17 RX ADMIN — ACETAMINOPHEN 650 MG: 325 TABLET, FILM COATED ORAL at 12:42

## 2018-11-17 RX ADMIN — DOCUSATE SODIUM 100 MG: 100 CAPSULE, LIQUID FILLED ORAL at 07:40

## 2018-11-17 ASSESSMENT — PAIN SCALES - GENERAL
PAINLEVEL_OUTOF10: 4
PAINLEVEL_OUTOF10: 6
PAINLEVEL_OUTOF10: 0
PAINLEVEL_OUTOF10: 4

## 2018-11-17 NOTE — PROGRESS NOTES
Assumed care of patient, received report from day RN. Communication board updated and reviewed POC with pt and significant other. Assessment complete. Reviewed NBs care time with mom and pumping. Pt denies pain at this time. No other needs, will continue to monitor pt.

## 2018-11-17 NOTE — DISCHARGE INSTRUCTIONS
POSTPARTUM DISCHARGE INSTRUCTIONS FOR MOM    YOB: 1995   Age: 23 y.o.               Admit Date: 2018     Discharge Date: 2018  Attending Doctor:  ERIC Muir*                  Allergies:  Patient has no known allergies.    Discharged to home by car. Discharged via wheelchair, hospital escort: Yes.  Special equipment needed: Not Applicable  Belongings with: Personal  Be sure to schedule a follow-up appointment with your primary care doctor or any specialists as instructed.     Discharge Plan:   Diet Plan: Discussed  Activity Level: Discussed  Confirmed Follow up Appointment: Patient to Call and Schedule Appointment  Confirmed Symptoms Management: Discussed  Medication Reconciliation Updated: Yes  Influenza Vaccine Indication: Not indicated: Previously immunized this influenza season and > 8 years of age    REASONS TO CALL YOUR OBSTETRICIAN:  1.   Persistent fever or shaking chills (Temperature higher than 100.4)  2.   Heavy bleeding (soaking more than 1 pad per hour); Passing clots  3.   Foul odor from vagina  4.   Mastitis (Breast infection; breast pain, chills, fever, redness)  5.   Urinary pain, burning or frequency  6.   Abdominal incision infection   7.   Severe depression longer than 24 hours    HAND WASHING  · Prior to handling the baby.  · Before breastfeeding or bottle feeding baby.  · After using the bathroom or changing the baby's diaper.    WOUND CARE  Ask your physician for additional care instructions.  In general:    ·  Incision:      · Keep clean and dry.    · Do NOT lift anything heavier than your baby for up to 6 weeks.    · There should not be any opening or pus.      VAGINAL CARE  · Nothing inside vagina for 6 weeks: no sexual intercourse, tampons or douching.  · Bleeding may continue for 2-4 weeks.  Amount may vary.    · Call your physician for heavy bleeding which means soaking more than 1 pad per hour    BIRTH CONTROL  · It is possible to become  "pregnant at any time after delivery and while breastfeeding.  · Plan to discuss a method of birth control with your physician at your follow up visit. visit.    DIET AND ELIMINATION  · Eating more fiber (bran cereal, fruits, and vegetables) and drinking plenty of fluids will help to avoid constipation.  · Urinary frequency after childbirth is normal.    POSTPARTUM BLUES  During the first few days after birth, you may experience a sense of the \"blues\" which may include impatience, irritability or even crying.  These feeling come and go quickly.  However, as many as 1 in 10 women experience emotional symptoms known as postpartum depression.    Postpartum depression:  May start as early as the second or third day after delivery or take several weeks or months to develop.  Symptoms of \"blues\" are present, but are more intense:  Crying spells; loss of appetite; feelings of hopelessness or loss of control; fear of touching the baby; over concern or no concern at all about the baby; little or no concern about your own appearance/caring for yourself; and/or inability to sleep or excessive sleeping.  Contact your physician if you are experiencing any of these symptoms.    Crisis Hotline:  · [place] Crisis Hotline:  2-114-DFMPZDU  Or 1-967.199.9609  · [place] Crisis Hotline:  1-243.101.5951  Or 135-883-1505    PREVENTING SHAKEN BABY:  If you are angry or stressed, PUT THE BABY IN THE CRIB, step away, take some deep breaths, and wait until you are calm to care for the baby.  DO NOT SHAKE THE BABY.  You are not alone, call a supporter for help.    · [facility] 24/7 crisis line 710-990-3469 or 1-163.211.1124  · You can also text them, text \"ANSWER\" to 370788    QUIT SMOKING/TOBACCO USE:  I understand the use of any tobacco products increases my chance of suffering from future heart disease and could cause other illnesses which may shorten my life. Quitting the use of tobacco products is the single most important thing I " can do to improve my health. For further information on smoking / tobacco cessation call a Toll Free Quit Line at 1-410.815.7847 (*[employer]) or 1-640.592.8910 (American Lung Association) or you can access the web based program at www.lungusa.org.    · [place] Tobacco Users Help Line:  (540) 831-1386       Toll Free: 1-983.494.6258  · Quit Tobacco Program [facility] (845)571-3375    DEPRESSION / SUICIDE RISK:  As you are discharged from this [facility], it is important to learn how to keep safe from harming yourself.    Recognize the warning signs:  · Abrupt changes in personality, positive or negative- including increase in energy   · Giving away possessions  · Change in eating patterns- significant weight changes-  positive or negative  · Change in sleeping patterns- unable to sleep or sleeping all the time   · Unwillingness or inability to communicate  · Depression  · Unusual sadness, discouragement and loneliness  · Talk of wanting to die  · Neglect of personal appearance   · Rebelliousness- reckless behavior  · Withdrawal from people/activities they love  · Confusion- inability to concentrate     If you or a loved one observes any of these behaviors or has concerns about self-harm, here's what you can do:  · Talk about it- your feelings and reasons for harming yourself  · Remove any means that you might use to hurt yourself (examples: pills, rope, extension cords, firearm)  · Get professional help from the community (Mental Health, Substance Abuse, psychological counseling)  · Do not be alone:Call your Safe Contact- someone whom you trust who will be there for you.  · Call your local CRISIS HOTLINE 444-9703 or 529-629-5147  · Call your local Children's Mobile Crisis Response Team [place] (716) 611-0966 or www.Joppel  · Call the toll free National Suicide Prevention Hotlines   · National Suicide Prevention Lifeline 853-171-DCBS (8258)  · National  [facility] 800-SUICIDE (404-0657)    DISCHARGE SURVEY:  Thank you for choosing [facility].  We hope we provided you with very good care.  You may be receiving a survey in the mail.  Please fill it out.  Your opinion is valuable to us.    ADDITIONAL EDUCATIONAL MATERIALS GIVEN TO PATIENT:        My signature on this form indicates that:  1.  I have reviewed and understand the above information  2.  My questions regarding this information have been answered to my satisfaction.  3.  I have formulated a plan with my discharge nurse to obtain my prescribed medication for home.

## 2018-11-17 NOTE — PROGRESS NOTES
0800- Patient alert, oriented.  VSS.  Family at bedside.  Fundus firm @ u, lochia light.  Patient voiding without difficulty.  Patient passing flatus.  Patient medicated for complaints of pain earlier this morning and states good control with oral meds.  Abdominal incision intact without erythema, swelling, or drainage.  Patient visits infant in NICU and is pumping her breasts for infant.  Will continue to monitor.  1010- Discharge education discussed with patient.  Patient verbalized understanding.  Prescription handed to patient.

## 2018-11-17 NOTE — CARE PLAN
Problem: Infection  Goal: Will remain free from infection  No signs of infection at this time .    Problem: Discharge Barriers/Planning  Goal: Patient's continuum of care needs will be met  Pts plan is to discharge tomorrow. Breast pump already picked up. Pt has resources for depression from social work and has reviewed with multiple nurses and her Dr about education and awareness. Pt states understanding.

## 2018-11-17 NOTE — DISCHARGE SUMMARY
Discharge Summary      Date of Admission: 2018  Date of Discharge: 2018    Admission Dx: Pregnancy  Indication for care in labor or delivery,   Patient Active Problem List    Diagnosis Date Noted   • Abnormal pregnancy US 2018   • Poor fetal growth affecting management of mother in third trimester 2018   • Supervision of normal pregnancy in third trimester 10/02/2018      Discharge Dx: S/P primary  section, low transverse incision    Delivery Date:     History reviewed. No pertinent past medical history.  Past Surgical History:   Procedure Laterality Date   • PRIMARY C SECTION N/A 2018    Procedure: PRIMARY C SECTION;  Surgeon: Rina Gasotn M.D.;  Location: LABOR AND DELIVERY;  Service: Obstetrics     OB History    Para Term  AB Living   2 1 1   1 1   SAB TAB Ectopic Molar Multiple Live Births   1       0 1      # Outcome Date GA Lbr Gurvinder/2nd Weight Sex Delivery Anes PTL Lv   2 Term 18 38w3d  2.535 kg (5 lb 9.4 oz) M CS-LTranv Spinal N ANNA      Complications: Fetal Intolerance   1 SAB 2018        FD        Allergies: Patient has no known allergies.    Hospital Course:   23 y.o. , now para2, was admitted with the above mentioned diagnosis, underwent  for fetal distress. Patient postpartum course was unremarkable, with progressive advancement in diet, ambulation and toleration of oral analgesia. Patient without complaints today and desires discharge.     Vitals:    11/15/18 0800 11/15/18 2000 11/16/18 0800 18   BP: 119/77 120/82 110/69 123/74   Pulse: 99 82 77 98   Resp:    Temp: 37.1 °C (98.7 °F) 36.8 °C (98.3 °F) 36.6 °C (97.8 °F) 36.3 °C (97.3 °F)   TempSrc: Temporal Temporal Oral Temporal   SpO2: 98% 96% 97% 97%   Weight:       Height:         Exam:  Breast Exam: Tenderness .no, Engorgement .no, Mastitis .no  Abdomen soft, non-tender. BS normal. No masses,  No organomegaly  Incision healing well. Intact    Fundus Tenderness: no, Below umbilicus: Yes,   Lochia light  perineum intact  ExtremitiesNormal    Meds:   No current facility-administered medications on file prior to encounter.      Current Outpatient Prescriptions on File Prior to Encounter   Medication Sig Dispense Refill   • Prenatal MV-Min-Fe Fum-FA-DHA (PRENATAL 1 PO) Take  by mouth.         Labs:   Recent Results (from the past 168 hour(s))   POCT Fetal Nonstress Test    Collection Time: 11/13/18  9:05 AM   Result Value Ref Range    NST Indications      NST Baseline      NST Uterine Activity      NST Acoustic Stimulation      NST Assessment      NST Action Necessary      NST Other Data      NST Return      NST Read By     Hold Blood Bank Specimen (Not Tested)    Collection Time: 11/13/18 10:50 AM   Result Value Ref Range    Holding Tube - Bb DONE    CBC WITH DIFFERENTIAL    Collection Time: 11/13/18 10:50 AM   Result Value Ref Range    WBC 9.6 4.8 - 10.8 K/uL    RBC 4.49 4.20 - 5.40 M/uL    Hemoglobin 11.6 (L) 12.0 - 16.0 g/dL    Hematocrit 35.5 (L) 37.0 - 47.0 %    MCV 79.1 (L) 81.4 - 97.8 fL    MCH 25.8 (L) 27.0 - 33.0 pg    MCHC 32.7 (L) 33.6 - 35.0 g/dL    RDW 38.9 35.9 - 50.0 fL    Platelet Count 298 164 - 446 K/uL    MPV 10.1 9.0 - 12.9 fL    Neutrophils-Polys 68.20 44.00 - 72.00 %    Lymphocytes 21.00 (L) 22.00 - 41.00 %    Monocytes 9.10 0.00 - 13.40 %    Eosinophils 0.80 0.00 - 6.90 %    Basophils 0.30 0.00 - 1.80 %    Immature Granulocytes 0.60 0.00 - 0.90 %    Nucleated RBC 0.00 /100 WBC    Neutrophils (Absolute) 6.55 2.00 - 7.15 K/uL    Lymphs (Absolute) 2.02 1.00 - 4.80 K/uL    Monos (Absolute) 0.88 (H) 0.00 - 0.85 K/uL    Eos (Absolute) 0.08 0.00 - 0.51 K/uL    Baso (Absolute) 0.03 0.00 - 0.12 K/uL    Immature Granulocytes (abs) 0.06 0.00 - 0.11 K/uL    NRBC (Absolute) 0.00 K/uL   HISTOLOGY REQUEST    Collection Time: 11/13/18  4:05 PM   Result Value Ref Range    Pathology Request Sent to [facility]    CBC without differential    Collection Time:  11/13/18 11:05 PM   Result Value Ref Range    WBC 16.4 (H) 4.8 - 10.8 K/uL    RBC 3.79 (L) 4.20 - 5.40 M/uL    Hemoglobin 9.2 (L) 12.0 - 16.0 g/dL    Hematocrit 29.9 (L) 37.0 - 47.0 %    MCV 78.9 (L) 81.4 - 97.8 fL    MCH 24.3 (L) 27.0 - 33.0 pg    MCHC 30.8 (L) 33.6 - 35.0 g/dL    RDW 38.8 35.9 - 50.0 fL    Platelet Count 275 164 - 446 K/uL    MPV 10.3 9.0 - 12.9 fL       normal postpartum course  No areas of skin breakdown/redness; surgical incision intact/healing    Discharge Instructions:  Discharge to home  Pelvic Rest x 6 weeks  Follow up: At [facility] or [facility] 1-2 week for incision check.     Discharge Meds:   Oxycodone 5 mg, as directed  Motrin 600 mg every 6 hours prn, as directed  Colace 100 mg BID  FeS04 325 mg as directed  Prenatal vitamins daily as directed

## 2018-11-17 NOTE — PROGRESS NOTES
"Tony Foreman  POD 4    Subjective:   Pain yes  Ambulating yes  Tolerating liquids yes  Tolerating regular diet yes  Flatus yes  BM yes  Bleeding no  Voiding yes  Breast feeding.yes  Breast tenderness no    Blood pressure 123/74, pulse 98, temperature 36.3 °C (97.3 °F), temperature source Temporal, resp. rate 18, height 1.651 m (5' 5\"), weight 78.9 kg (174 lb), last menstrual period 02/17/2018, SpO2 97 %, currently breastfeeding.  Breast tenderness no, Engorgement yes, Mastitis no  CVS: RRR  Resp: CTA bilaterally  Abdomen: Abdomen soft, non-tender. BS normal. No masses,  No organomegaly  Incision: clean/dry/intact, dressing with dermabond intact  Fundus: Tender no, below umbilicus Yes,   ExtremitiesNormal    Meds:   No current facility-administered medications on file prior to encounter.      Current Outpatient Prescriptions on File Prior to Encounter   Medication Sig Dispense Refill   • Prenatal MV-Min-Fe Fum-FA-DHA (PRENATAL 1 PO) Take  by mouth.         Lab: No results found for this or any previous visit (from the past 48 hour(s)).    Assessment and Plan  POD#4 s/p primary LTCS at term  Doing well postpartum, meeting all postop milestones  Discharge home today  Narcotic ordered. Pt to take tylnol/motrin prn mild to moderate pain and narcotic for severe  Do not drive while on narcotics  F/u [facility] for incision check in 2 weeks  Pelvic rest for 6 weeks.    Continue Routine postpartum care  Ambulation encouraged          "

## 2018-11-30 ENCOUNTER — POST PARTUM (OUTPATIENT)
Dept: OBGYN | Facility: CLINIC | Age: 23
End: 2018-11-30

## 2018-11-30 VITALS — WEIGHT: 158 LBS | SYSTOLIC BLOOD PRESSURE: 108 MMHG | BODY MASS INDEX: 26.29 KG/M2 | DIASTOLIC BLOOD PRESSURE: 64 MMHG

## 2018-11-30 DIAGNOSIS — Z09 POSTOP CHECK: ICD-10-CM

## 2018-11-30 ASSESSMENT — EDINBURGH POSTNATAL DEPRESSION SCALE (EPDS)
I HAVE BEEN SO UNHAPPY THAT I HAVE HAD DIFFICULTY SLEEPING: NOT VERY OFTEN
I HAVE LOOKED FORWARD WITH ENJOYMENT TO THINGS: RATHER LESS THAN I USED TO
THE THOUGHT OF HARMING MYSELF HAS OCCURRED TO ME: NEVER
TOTAL SCORE: 17
I HAVE BEEN ANXIOUS OR WORRIED FOR NO GOOD REASON: YES, VERY OFTEN
I HAVE FELT SAD OR MISERABLE: YES, QUITE OFTEN
THINGS HAVE BEEN GETTING ON TOP OF ME: YES, SOMETIMES I HAVEN'T BEEN COPING AS WELL AS USUAL
I HAVE BEEN SO UNHAPPY THAT I HAVE BEEN CRYING: YES, QUITE OFTEN
I HAVE BEEN ABLE TO LAUGH AND SEE THE FUNNY SIDE OF THINGS: NOT QUITE SO MUCH NOW
I HAVE BLAMED MYSELF UNNECESSARILY WHEN THINGS WENT WRONG: YES, MOST OF THE TIME
I HAVE FELT SCARED OR PANICKY FOR NO GOOD REASON: YES, SOMETIMES

## 2018-11-30 NOTE — PROGRESS NOTES
Incision Check    CC: s/p c/s incision check    HPI: 23 y.o.  s/p  delivery on 18 with me for fetal growth restriction and repetitive spontaneous fetal heart rate decelerations at 38w3d here for incision check. Surgery and postpartum course were uncomplicated  Pt reports doing well with minimal pain.  No fevers.  Denies trouble with urination or BM.  Minimal vaginal bleeding.    BFing some but also supplementing, no concerns regarding this today.    Denies concerns about mood. Although when asked further about her EDPDS score of 17 patient does endorse that she at times feels overwhelmed.  Denies thoughts of hurting herself or others.  Denies audio or visual hallucinations.  Patient reports she is not getting very much sleep.  Baby eats approximately every 30-45 minutes she says.  FOB is helping her with baby at home although is working during the day.      /64   Wt 71.7 kg (158 lb)   LMP 2018 (Approximate)   BMI 26.29 kg/m²   Gen: AAO, NAD  Abd: soft, NT, ND, incision C/D/I, healing well    A/P: 23 y.o.  s/p c/s on  for growth restriction and NRFHTs  - no signs of postop complications  -Discussed with patient her positive postpartum depression screening today and concerns for postpartum depression.  Patient denies suicidal ideation or thoughts of harming others.  Discussed importance of sleep and strictly instructed patient that she is to enroll the help of FOB with feedings at night, especially since she is not excessively breast-feeding.  Instructed to get at least a 4-hour, ideally an occasional 6-hour continuous period of sleep and to let others help with baby including feeding in order to help her achieve this.  Patient today does not feel like she needs medication and declines offer to start medication.  Is willing to see and is accepting of referral to behavioral health which I have ordered urgently today.  We discussed that should she have thoughts of hurting  herself or others she needs to call 911.  Patient is breast understanding.    - contraception: Patient undecided.  We discussed options available and she is leaning towards Nexplanon.  We will continue to consider      RTC for mood check in 2 weeks.    Rina Gaston MD  [facility], Women's Health

## 2018-11-30 NOTE — PROGRESS NOTES
Pt. Here today for C/S check delivered on 11/13/18  Currently : breast and bottle feeding  Pt. States no complaints

## 2018-12-04 ENCOUNTER — TELEPHONE (OUTPATIENT)
Dept: PEDIATRICS | Facility: CLINIC | Age: 23
End: 2018-12-04

## 2018-12-13 ENCOUNTER — POST PARTUM (OUTPATIENT)
Dept: OBGYN | Facility: CLINIC | Age: 23
End: 2018-12-13

## 2018-12-13 VITALS — DIASTOLIC BLOOD PRESSURE: 68 MMHG | WEIGHT: 156 LBS | BODY MASS INDEX: 25.96 KG/M2 | SYSTOLIC BLOOD PRESSURE: 108 MMHG

## 2018-12-13 PROCEDURE — 90050 PR POSTPARTUM VISIT: CPT | Performed by: OBSTETRICS & GYNECOLOGY

## 2018-12-13 ASSESSMENT — EDINBURGH POSTNATAL DEPRESSION SCALE (EPDS)
I HAVE BLAMED MYSELF UNNECESSARILY WHEN THINGS WENT WRONG: YES, SOME OF THE TIME
I HAVE LOOKED FORWARD WITH ENJOYMENT TO THINGS: AS MUCH AS I EVER DID
THINGS HAVE BEEN GETTING ON TOP OF ME: NO, MOST OF THE TIME I HAVE COPED QUITE WELL
TOTAL SCORE: 7
I HAVE BEEN ANXIOUS OR WORRIED FOR NO GOOD REASON: HARDLY EVER
I HAVE FELT SAD OR MISERABLE: NO, NOT AT ALL
THE THOUGHT OF HARMING MYSELF HAS OCCURRED TO ME: NEVER
I HAVE BEEN SO UNHAPPY THAT I HAVE HAD DIFFICULTY SLEEPING: NOT VERY OFTEN
I HAVE BEEN SO UNHAPPY THAT I HAVE BEEN CRYING: ONLY OCCASIONALLY
I HAVE BEEN ABLE TO LAUGH AND SEE THE FUNNY SIDE OF THINGS: NOT QUITE SO MUCH NOW
I HAVE FELT SCARED OR PANICKY FOR NO GOOD REASON: NO, NOT AT ALL

## 2018-12-13 NOTE — PROGRESS NOTES
PP mood check    CC: f/u mood postpartum    HPI: 23 y.o.  s/p  delivery on 18 with me for fetal growth restriction and repetitive spontaneous fetal heart rate decelerations at 38w3d.  Seen for incision check by me  and with + EDPDS screen (17).  Pt referred to behavioral health and brought back today to f/u mood.  Pt reports doing well with minimal pain.  Is getting more sleep and reports her mood is much better.  Did not schedule with behavioral health because she reports feeling well and doesn't think she needs to see them.  BFing and supplementing still, which is going well.     Denies SI.  EDPDS:7    /68   Wt 70.8 kg (156 lb)   LMP 2018 (Approximate)   BMI 25.96 kg/m²   Gen: AAO, NAD  Abd: soft, NT, ND, incision C/D/I, healing well    A/P: 23 y.o.  s/p c/s on , doing well  - mood much improved, feels better and does not desire to see behavioral health  - contraception: would like to try nexlanon, order placed today      RTC for routine postpartum visit    Rina Gaston MD  [facility], Women's Health

## 2018-12-13 NOTE — PROGRESS NOTES
Pt. Here today for C/S check delivered on 11/13/18  Currently : breast and bottle feeding   Pt. States feeling much better.

## 2019-01-08 ENCOUNTER — POST PARTUM (OUTPATIENT)
Dept: OBGYN | Facility: CLINIC | Age: 24
End: 2019-01-08
Payer: MEDICAID

## 2019-01-08 VITALS — SYSTOLIC BLOOD PRESSURE: 102 MMHG | BODY MASS INDEX: 25.63 KG/M2 | WEIGHT: 154 LBS | DIASTOLIC BLOOD PRESSURE: 58 MMHG

## 2019-01-08 PROCEDURE — 90050 PR POSTPARTUM VISIT: CPT | Performed by: NURSE PRACTITIONER

## 2019-01-08 ASSESSMENT — EDINBURGH POSTNATAL DEPRESSION SCALE (EPDS)
THE THOUGHT OF HARMING MYSELF HAS OCCURRED TO ME: NEVER
I HAVE FELT SCARED OR PANICKY FOR NO GOOD REASON: NO, NOT AT ALL
I HAVE BEEN ABLE TO LAUGH AND SEE THE FUNNY SIDE OF THINGS: NOT QUITE SO MUCH NOW
I HAVE BEEN ANXIOUS OR WORRIED FOR NO GOOD REASON: HARDLY EVER
THINGS HAVE BEEN GETTING ON TOP OF ME: NO, MOST OF THE TIME I HAVE COPED QUITE WELL
I HAVE BLAMED MYSELF UNNECESSARILY WHEN THINGS WENT WRONG: NO, NEVER
I HAVE FELT SAD OR MISERABLE: NO, NOT AT ALL
I HAVE LOOKED FORWARD WITH ENJOYMENT TO THINGS: RATHER LESS THAN I USED TO
I HAVE BEEN SO UNHAPPY THAT I HAVE HAD DIFFICULTY SLEEPING: NOT AT ALL
TOTAL SCORE: 4
I HAVE BEEN SO UNHAPPY THAT I HAVE BEEN CRYING: NO, NEVER

## 2019-01-08 NOTE — PROGRESS NOTES
Pt here today for postpartum exam.  Operation Date:11/13/2018  Breast and formula feeding  BCM: Implant, [facility].   LMP: not yet  WT: 154 lb  BP: 102/58  Pt states no complaints today  Good ph: [phone]

## 2019-01-08 NOTE — PROGRESS NOTES
Subjective   Subjective:    Tony Foreman is a 23 y.o. female who presents for her postpartum exam s/p LTCS for intrauterine growth restriction and non-reassuring fetal heart tracing. Her prenatal course was complicated by the below problem list. Her postpartum course was uncomplicated. She denies dysuria, vaginal bleeding, odor, itching or breast problems. She is both breast and bottle feeding. She desires an implant for her birth control method. Reports not having sex prior to this appointment. Eating a regular diet without difficulty. Bowel movement are Normal.  The patient is not having any pain. No current menses. Patient Denies Incisional pain, drainage or redness. Patient denies postpartum depression. States feels well with mental health status.     Problem List     Patient Active Problem List    Diagnosis Date Noted   • Post partum depression 12/04/2018   • Abnormal pregnancy US 11/13/2018   • Poor fetal growth affecting management of mother in third trimester 11/07/2018   • Supervision of normal pregnancy in third trimester 10/02/2018       Objective    See PE  Lab: H&H upon discharge 9.2/29.9  Weight - 154lb  Vitals - 102/58    Assessment   Assessment:    1. PP care of lactating women   2. Exam WNL   3. Pap WNL 2017  4. Desires contraception - implant     Plan   Plan:    1. Breastfeeding support   2. Continue PNV   3. Contraceptive counseling - follow up w health dept or [facility] for nex[facility]on  4. Encouraged condom use   5. Discussed diet, exercise and resumption of sexual activity   6. Preconception guidance for next pregnancy if applicable. Previous c/s, postpartum depression hx and IUGR for risk factors. Folic acid for all women of childbearing age.   7.  Follow up needed - continue annual women's health  8.  Smoking/etoh/drug screening no exposure.

## 2019-01-08 NOTE — PROGRESS NOTES
Subjective:      Tony Foreman is a 23 y.o. female who presents with Postpartum care            HPI    ROS       Objective:     /58   Wt 69.9 kg (154 lb)   LMP 02/17/2018 (Approximate)   BMI 25.63 kg/m²      Physical Exam   Constitutional: She is oriented to person, place, and time. She appears well-developed and well-nourished.   HENT:   Head: Normocephalic.   Eyes: Pupils are equal, round, and reactive to light.   Neck: Normal range of motion. No thyromegaly present.   Cardiovascular: Normal rate, regular rhythm and normal heart sounds.    Pulmonary/Chest: Effort normal and breath sounds normal.   Abdominal: Soft. Bowel sounds are normal.   Genitourinary: Vagina normal and uterus normal.   Neurological: She is alert and oriented to person, place, and time.   Skin: Skin is warm and dry.   Psychiatric: She has a normal mood and affect. Her behavior is normal. Judgment and thought content normal.   Nursing note and vitals reviewed.              Assessment/Plan:     There are no diagnoses linked to this encounter.

## 2020-10-20 ENCOUNTER — GYNECOLOGY VISIT (OUTPATIENT)
Dept: OBGYN | Facility: CLINIC | Age: 25
End: 2020-10-20

## 2020-10-20 VITALS
BODY MASS INDEX: 23.82 KG/M2 | WEIGHT: 143 LBS | SYSTOLIC BLOOD PRESSURE: 110 MMHG | HEIGHT: 65 IN | DIASTOLIC BLOOD PRESSURE: 70 MMHG

## 2020-10-20 DIAGNOSIS — N93.8 DUB (DYSFUNCTIONAL UTERINE BLEEDING): ICD-10-CM

## 2020-10-20 PROCEDURE — 99213 OFFICE O/P EST LOW 20 MIN: CPT | Mod: 25 | Performed by: OBSTETRICS & GYNECOLOGY

## 2020-10-20 PROCEDURE — 76830 TRANSVAGINAL US NON-OB: CPT | Performed by: OBSTETRICS & GYNECOLOGY

## 2020-10-20 RX ORDER — MISOPROSTOL 200 UG/1
200 TABLET ORAL 4 TIMES DAILY
Qty: 4 TAB | Refills: 0 | COMMUNITY
Start: 2020-10-20 | End: 2020-12-04

## 2020-10-20 NOTE — NON-PROVIDER
Patient here for GYN/DUB.  UPT=positive  LMP=approximated 8/15/2020  MATTHEW=[birth year]  GA=9w3d  Last pap 2017=negative  Phone number: [phone]  Pharmacy verified  C/o vaginal bleeding heavy on and off for 2 days. Today is darker and like a period

## 2020-10-20 NOTE — PROGRESS NOTES
"Tony Foreman,  25 y.o.  female presents today with a C/O of :oligomenorrhea. Pt   No LMP recorded.       Subjective : Nausea/Vomiting: No:  Abdominal /pelvic cramping : Yes :   vaginal bleeding:Yes , with some clots , now just pinkish , but with cramps     Menstrual Flow : moderate   GYN ROS:  normal menses, no abnormal bleeding, pelvic pain or discharge, no breast pain or new or enlarging lumps on self exam      Past Medical History:   Diagnosis Date   • Post partum depression 2018       Past Surgical History:   Procedure Laterality Date   • PRIMARY C SECTION N/A 2018    Procedure: PRIMARY C SECTION;  Surgeon: Rina Gaston M.D.;  Location: LABOR AND DELIVERY;  Service: Obstetrics       Current Birth control:  none    OB History    Para Term  AB Living   2 1 1   1 1   SAB TAB Ectopic Molar Multiple Live Births   1       0 1      # Outcome Date GA Lbr Gurvinder/2nd Weight Sex Delivery Anes PTL Lv   2 Term 18 38w3d  2.535 kg (5 lb 9.4 oz) M CS-LTranv Spinal N ANNA      Complications: Fetal Intolerance   1 SAB 2018        FD           Allergy:      Patient has no known allergies.    Exam;    /70 (BP Location: Left arm, Patient Position: Sitting)   Ht 1.651 m (5' 5\")   Wt 64.9 kg (143 lb)   BMI 23.80 kg/m²   well-appearing, well-hydrated, well-nourished, thin, comfortable, alert, in no apparent distress  normal;   PERRLA, EOMI, fundi grossly normal, no papilledema, no AV nicking, sclera clear  Clear to auscultation  RRR No M  abdomen is soft without significant tenderness, masses, organomegaly or guarding  External genitalia normal, Vagina normal without dischargeLab.    No results found for this or any previous visit (from the past 336 hour(s)).  Ultrasound :     Per my Read   Transvaginal   First trimester findings: Intrauterine gestational sac seen: yes  Gestational sac summary: fetal pole seen, yolk sac seen, fetal pole , no cardiac , large asymmetric yolk sac , " asymmetric gestational sac   Fetal cardiac activity: absent  CRL  7weeks   C/W missed    Assessment:    missed/threatened     Plan:  2 weeks  Discussed options for observation , vs cytotec , vs D  & C   Patient decided  On Cytotec 400 mcg , per vagina , followed after 2 hrs with  400 mcg buccally   RTC 2 weeks

## 2020-12-04 ENCOUNTER — OFFICE VISIT (OUTPATIENT)
Dept: URGENT CARE | Facility: CLINIC | Age: 25
End: 2020-12-04

## 2020-12-04 VITALS
BODY MASS INDEX: 23.82 KG/M2 | WEIGHT: 143 LBS | SYSTOLIC BLOOD PRESSURE: 108 MMHG | OXYGEN SATURATION: 99 % | HEIGHT: 65 IN | DIASTOLIC BLOOD PRESSURE: 62 MMHG | TEMPERATURE: 97.2 F | HEART RATE: 77 BPM | RESPIRATION RATE: 12 BRPM

## 2020-12-04 DIAGNOSIS — K04.7 DENTAL INFECTION: ICD-10-CM

## 2020-12-04 PROCEDURE — 99203 OFFICE O/P NEW LOW 30 MIN: CPT | Performed by: NURSE PRACTITIONER

## 2020-12-04 RX ORDER — AMOXICILLIN AND CLAVULANATE POTASSIUM 875; 125 MG/1; MG/1
1 TABLET, FILM COATED ORAL 2 TIMES DAILY
Qty: 20 TAB | Refills: 0 | Status: SHIPPED | OUTPATIENT
Start: 2020-12-04 | End: 2020-12-14

## 2020-12-04 ASSESSMENT — ENCOUNTER SYMPTOMS
CHILLS: 0
HEADACHES: 0
DIARRHEA: 0
DIZZINESS: 0
ABDOMINAL PAIN: 0
FEVER: 0
MYALGIAS: 0
NAUSEA: 0
VOMITING: 0

## 2020-12-04 NOTE — PROGRESS NOTES
"Subjective:   Tony Foreman is a 25 y.o. female who presents for Tooth Ache (poss tooth infection )      HPI  25-year-old female patient in urgent care for new onset symptoms 2 days ago.  Patient states about a month ago she had a tooth extraction and is wondering if after having that procedure it produced an infection.  Patient states her right upper jaw hurts.  Denies fever, chills, nausea or vomiting, drainage or foul taste in the mouth.  Patient does have an appointment for further dental work in 6 days but was unable to get through the weekend without some treatment.  She has not taken anything over-the-counter for symptoms.    Review of Systems   Constitutional: Negative for chills, fever and malaise/fatigue.   Gastrointestinal: Negative for abdominal pain, diarrhea, nausea and vomiting.   Musculoskeletal: Negative for myalgias.   Skin: Negative for rash.   Neurological: Negative for dizziness and headaches.       Patient Active Problem List   Diagnosis   • Supervision of normal pregnancy in third trimester   • Poor fetal growth affecting management of mother in third trimester   • Abnormal pregnancy US   • Post partum depression     Past Surgical History:   Procedure Laterality Date   • PRIMARY C SECTION N/A 11/13/2018    Procedure: PRIMARY C SECTION;  Surgeon: Rina Gaston M.D.;  Location: LABOR AND DELIVERY;  Service: Obstetrics     Social History     Tobacco Use   • Smoking status: Never Smoker   • Smokeless tobacco: Never Used   Substance Use Topics   • Alcohol use: No   • Drug use: No      Family History   Problem Relation Age of Onset   • Diabetes Mother    • Hypertension Mother    • Diabetes Father    • Hypertension Father       (Allergies, Medications, & Tobacco/Substance Use were reconciled by the Medical Assistant and reviewed by myself. The family history is prepopulated)     Objective:     /62   Pulse 77   Temp 36.2 °C (97.2 °F) (Temporal)   Resp 12   Ht 1.651 m (5' 5\")   Wt " 64.9 kg (143 lb)   SpO2 99%   BMI 23.80 kg/m²     Physical Exam  Vitals signs reviewed.   Constitutional:       Appearance: Normal appearance.   HENT:      Mouth/Throat:      Mouth: Mucous membranes are moist.      Dentition: Abnormal dentition. Does not have dentures. Dental tenderness, gingival swelling and dental caries present. No gum lesions.     Cardiovascular:      Rate and Rhythm: Normal rate and regular rhythm.      Heart sounds: Normal heart sounds.   Pulmonary:      Effort: Pulmonary effort is normal.      Breath sounds: Normal breath sounds.   Skin:     General: Skin is warm.   Neurological:      Mental Status: She is alert and oriented to person, place, and time.   Psychiatric:         Mood and Affect: Mood normal.         Behavior: Behavior normal.         Thought Content: Thought content normal.         Judgment: Judgment normal.         Assessment/Plan:     1. Dental infection  amoxicillin-clavulanate (AUGMENTIN) 875-125 MG Tab   Discussed physical examination findings are consistent with action.  Will provide.  Advised patient to finish out all antibiotics prescribed.  Discussed supportive care including warm water compresses, ice, NSAIDs and Tylenol per 's instructions and advised her to follow-up with her dentist as scheduled    Differential diagnosis, natural history, supportive care, and indications for immediate follow-up discussed.    Advised the patient to follow-up with the primary care physician for recheck, reevaluation, and consideration of further management.    Please note that this dictation was created using voice recognition software. I have made a reasonable attempt to correct obvious errors, but I expect that there are errors of grammar and possibly content that I did not discover before finalizing the note.    This note was electronically signed IHSAN Bradford

## 2021-04-23 ENCOUNTER — APPOINTMENT (OUTPATIENT)
Dept: OBGYN | Facility: CLINIC | Age: 26
End: 2021-04-23

## 2021-04-23 ENCOUNTER — HOSPITAL ENCOUNTER (OUTPATIENT)
Dept: LAB | Facility: MEDICAL CENTER | Age: 26
End: 2021-04-23
Attending: OBSTETRICS & GYNECOLOGY
Payer: COMMERCIAL

## 2021-04-23 ENCOUNTER — GYNECOLOGY VISIT (OUTPATIENT)
Dept: OBGYN | Facility: CLINIC | Age: 26
End: 2021-04-23

## 2021-04-23 VITALS — WEIGHT: 140 LBS | BODY MASS INDEX: 23.3 KG/M2 | SYSTOLIC BLOOD PRESSURE: 102 MMHG | DIASTOLIC BLOOD PRESSURE: 60 MMHG

## 2021-04-23 DIAGNOSIS — Z87.59 HISTORY OF PRIOR PREGNANCY WITH IUGR NEWBORN: ICD-10-CM

## 2021-04-23 DIAGNOSIS — N93.8 DUB (DYSFUNCTIONAL UTERINE BLEEDING): ICD-10-CM

## 2021-04-23 PROCEDURE — 76857 US EXAM PELVIC LIMITED: CPT | Performed by: OBSTETRICS & GYNECOLOGY

## 2021-04-23 PROCEDURE — 99213 OFFICE O/P EST LOW 20 MIN: CPT | Mod: 25 | Performed by: OBSTETRICS & GYNECOLOGY

## 2021-04-23 NOTE — PROGRESS NOTES
Cc: Confirmation of pregnancy    HPI:  The patient is a 25 y.o.  here for confirmation of pregnancy exam.  States that she had a spontaneous miscarriage in 2020.  In December she had 2 menses 2 weeks apart which were both heavy.  There was no vaginal bleeding since then.  She did have some symptoms of morning sickness in January throughout February but none since.     Patient's previous pregnancy was complicated by IUGR with nonreassuring fetal heart tracing noted in the office at 38 weeks.  She had a nonreassuring strip on labor and delivery and was counseled on a primary  section which she accepted.    Fetal movement denies   Vaginal bleeding denies    Leakage of fluid denies    Cramping denies   Nausea/vomiting: denies   HA  denies   Dysuria  denies     Review of systems:  Pertinent positives documented in HPI and all other systems reviewed & are negative    Gyn History: LMP unknown.  Cycles normally regular monthly.  Denies Hx STDs.   Condoms for contraception in the past    Pregnancy related complications:    OB History    Para Term  AB Living   3 1 1   2 1   SAB TAB Ectopic Molar Multiple Live Births   2       0 1      # Outcome Date GA Lbr Gurvinder/2nd Weight Sex Delivery Anes PTL Lv   3 Term 18 38w3d  2.535 kg (5 lb 9.4 oz) M CS-LTranv Spinal N ANNA      Complications: Fetal Intolerance   2 SAB 2018        FD   1 SAB                  Past Medical History:   Diagnosis Date   • Post partum depression 2018     Past Surgical History:   Procedure Laterality Date   • PRIMARY C SECTION N/A 2018    Procedure: PRIMARY C SECTION;  Surgeon: Rina Gaston M.D.;  Location: LABOR AND DELIVERY;  Service: Obstetrics     Social History     Socioeconomic History   • Marital status: Single     Spouse name: Not on file   • Number of children: Not on file   • Years of education: Not on file   • Highest education level: Not on file   Occupational History   • Not on file    Tobacco Use   • Smoking status: Never Smoker   • Smokeless tobacco: Never Used   Substance and Sexual Activity   • Alcohol use: No   • Drug use: No   • Sexual activity: Yes     Partners: Male     Comment: desires nexplanon   Other Topics Concern   • Not on file   Social History Narrative   • Not on file     Social Determinants of Health     Financial Resource Strain:    • Difficulty of Paying Living Expenses:    Food Insecurity:    • Worried About Running Out of Food in the Last Year:    • Ran Out of Food in the Last Year:    Transportation Needs:    • Lack of Transportation (Medical):    • Lack of Transportation (Non-Medical):    Physical Activity:    • Days of Exercise per Week:    • Minutes of Exercise per Session:    Stress:    • Feeling of Stress :    Social Connections:    • Frequency of Communication with Friends and Family:    • Frequency of Social Gatherings with Friends and Family:    • Attends Yazidism Services:    • Active Member of Clubs or Organizations:    • Attends Club or Organization Meetings:    • Marital Status:    Intimate Partner Violence:    • Fear of Current or Ex-Partner:    • Emotionally Abused:    • Physically Abused:    • Sexually Abused:      Family History   Problem Relation Age of Onset   • Diabetes Mother    • Hypertension Mother    • Diabetes Father    • Hypertension Father      Allergies:   Allergies as of 04/23/2021   • (No Known Allergies)       PE:    Blood pressure 102/60  Weight 140 pounds    General:appears stated age, is in no apparent distress  Head: normocephalic, non-tender  Neck: neck is supple, no jugular venous distension  Abdomen: Bowel sounds positive, nondistended, soft, nontender x4, no rebound or guarding. No organomegaly. No masses.  This enlarged at 2 below the umbilicus.  Skin: No rashes, or ulcers or lesions seen  Psychiatric: Patient shows appropriate affect, is alert and oriented x3, intact judgment and insight.    transabdominal scan and per my  read:    Indication: missed menses, positive pregnancy test.   LMP unknown    Findings: leigh intrauterine pregnancy in variable position.  Heart rate is 148 bpm.  Fetal biometry as follows  BPD 16 weeks 6 days  HC 16 weeks 6 days  AC 17 weeks 1 day  FL 15 weeks 3-day  Average gestational age 16 weeks 4 days  Estimated due date by AUA 10/4/2021  Placenta anterior.    DATING: 10/4/2021 by today's ultrasound as LMP is unknown as per ACOG guidelines.    A/P:   1. History of prior pregnancy with IUGR   AFP TETRA       # Newly diagnosed pregnancy  Screening options for Down Syndrome and neural tube defects discussed.  Patient desires quad screening.  We will have patient perform quad screening after financial visit.  SAB precautions discussed  Increase water intake and encouraged healthy nutrition.  Begin prenatal vitamins.    Spent 15 minutes in face-to-face patient contact in which greater than 50% of that visit was spent in counseling/coordination of care of newly diagnosed pregnancy including medical and surgical options of care.    F/u in 4 weeks for new OB visit

## 2021-04-27 LAB
# FETUSES US: NORMAL
AFP MOM SERPL: 1.05
AFP SERPL-MCNC: 39 NG/ML
AGE - REPORTED: 25.2 YR
CURRENT SMOKER: NO
FAMILY MEMBER DISEASES HX: NO
GA METHOD: NORMAL
GA: NORMAL WK
HCG MOM SERPL: 1.03
HCG SERPL-ACNC: NORMAL IU/L
HX OF HEREDITARY DISORDERS: NO
IDDM PATIENT QL: NO
INHIBIN A MOM SERPL: 1.12
INHIBIN A SERPL-MCNC: 173 PG/ML
INTEGRATED SCN PATIENT-IMP: NORMAL
PATHOLOGY STUDY: NORMAL
SPECIMEN DRAWN SERPL: NORMAL
U ESTRIOL MOM SERPL: 1.01
U ESTRIOL SERPL-MCNC: 1.22 NG/ML

## 2021-05-07 ENCOUNTER — INITIAL PRENATAL (OUTPATIENT)
Dept: OBGYN | Facility: CLINIC | Age: 26
End: 2021-05-07

## 2021-05-07 ENCOUNTER — APPOINTMENT (OUTPATIENT)
Dept: OBGYN | Facility: CLINIC | Age: 26
End: 2021-05-07

## 2021-05-07 ENCOUNTER — HOSPITAL ENCOUNTER (OUTPATIENT)
Facility: MEDICAL CENTER | Age: 26
End: 2021-05-07
Attending: PHYSICIAN ASSISTANT
Payer: COMMERCIAL

## 2021-05-07 VITALS
DIASTOLIC BLOOD PRESSURE: 60 MMHG | HEIGHT: 65 IN | SYSTOLIC BLOOD PRESSURE: 90 MMHG | WEIGHT: 142.2 LBS | BODY MASS INDEX: 23.69 KG/M2

## 2021-05-07 DIAGNOSIS — O09.92 SUPERVISION OF HIGH RISK PREGNANCY, ANTEPARTUM, SECOND TRIMESTER: ICD-10-CM

## 2021-05-07 DIAGNOSIS — Z98.891 HISTORY OF C-SECTION: ICD-10-CM

## 2021-05-07 PROBLEM — Z34.93 SUPERVISION OF NORMAL PREGNANCY IN THIRD TRIMESTER: Status: RESOLVED | Noted: 2018-10-02 | Resolved: 2021-05-07

## 2021-05-07 PROBLEM — O28.3 ABNORMAL PREGNANCY US: Status: RESOLVED | Noted: 2018-11-13 | Resolved: 2021-05-07

## 2021-05-07 PROBLEM — O36.5930 POOR FETAL GROWTH AFFECTING MANAGEMENT OF MOTHER IN THIRD TRIMESTER: Status: RESOLVED | Noted: 2018-11-07 | Resolved: 2021-05-07

## 2021-05-07 LAB
APPEARANCE UR: NORMAL
BILIRUB UR STRIP-MCNC: NORMAL MG/DL
COLOR UR AUTO: NORMAL
GLUCOSE UR STRIP.AUTO-MCNC: NEGATIVE MG/DL
KETONES UR STRIP.AUTO-MCNC: NEGATIVE MG/DL
LEUKOCYTE ESTERASE UR QL STRIP.AUTO: NEGATIVE
NITRITE UR QL STRIP.AUTO: NEGATIVE
PH UR STRIP.AUTO: 6 [PH] (ref 5–8)
PROT UR QL STRIP: NEGATIVE MG/DL
RBC UR QL AUTO: NEGATIVE
SP GR UR STRIP.AUTO: <=1.005
UROBILINOGEN UR STRIP-MCNC: NORMAL MG/DL

## 2021-05-07 PROCEDURE — 59402 PR NEW OB HIGH RISK: CPT | Performed by: PHYSICIAN ASSISTANT

## 2021-05-07 PROCEDURE — 81002 URINALYSIS NONAUTO W/O SCOPE: CPT | Performed by: PHYSICIAN ASSISTANT

## 2021-05-07 PROCEDURE — 8199 PREG CTR HIGH RISK PKG RATE (SYSTEM): Performed by: PHYSICIAN ASSISTANT

## 2021-05-07 RX ORDER — FERROUS SULFATE 325(65) MG
325 TABLET ORAL DAILY
COMMUNITY
End: 2021-10-26

## 2021-05-07 RX ORDER — IBUPROFEN 200 MG
500 CAPSULE ORAL DAILY
COMMUNITY
End: 2021-10-26

## 2021-05-07 ASSESSMENT — ENCOUNTER SYMPTOMS
CARDIOVASCULAR NEGATIVE: 1
PSYCHIATRIC NEGATIVE: 1
RESPIRATORY NEGATIVE: 1
CONSTITUTIONAL NEGATIVE: 1
NEUROLOGICAL NEGATIVE: 1
EYES NEGATIVE: 1
MUSCULOSKELETAL NEGATIVE: 1
GASTROINTESTINAL NEGATIVE: 1

## 2021-05-07 ASSESSMENT — EDINBURGH POSTNATAL DEPRESSION SCALE (EPDS)
I HAVE BLAMED MYSELF UNNECESSARILY WHEN THINGS WENT WRONG: NOT VERY OFTEN
I HAVE BEEN SO UNHAPPY THAT I HAVE BEEN CRYING: NO, NEVER
I HAVE BEEN ABLE TO LAUGH AND SEE THE FUNNY SIDE OF THINGS: AS MUCH AS I ALWAYS COULD
I HAVE FELT SAD OR MISERABLE: NO, NOT AT ALL
I HAVE BEEN ANXIOUS OR WORRIED FOR NO GOOD REASON: NO, NOT AT ALL
THINGS HAVE BEEN GETTING ON TOP OF ME: NO, MOST OF THE TIME I HAVE COPED QUITE WELL
I HAVE BEEN SO UNHAPPY THAT I HAVE HAD DIFFICULTY SLEEPING: NOT AT ALL
THE THOUGHT OF HARMING MYSELF HAS OCCURRED TO ME: NEVER
TOTAL SCORE: 2
I HAVE FELT SCARED OR PANICKY FOR NO GOOD REASON: NO, NOT AT ALL
I HAVE LOOKED FORWARD WITH ENJOYMENT TO THINGS: AS MUCH AS I EVER DID

## 2021-05-07 NOTE — PROGRESS NOTES
"Subjective:      Tony Foreman is a 25 y.o. female who presents with New ob visit. Pt unsure of LMP but US at 16wk confirms MATTHEW 10/4/21. Pt has hx of 2 SAB without complications and term primary C/S for NRFHT without complications. Denies surgical or GDM, PIH complications. Pt unsure if she wants TOLAC or repeat C/S with BTL - all options d/w pt in depth and TOLAC consent given to pt to read. Pt to d/w partner and let us know by next visit. Pt denies PMHx, other SHx. NKDA. Taking PNV gummies, also PO calcium, Iron supplements. Denies tob, etoh or drug use. Pt currently denies cramping, bleeding or pain. +FM.               HPI    Review of Systems   Constitutional: Negative.    HENT: Negative.    Eyes: Negative.    Respiratory: Negative.    Cardiovascular: Negative.    Gastrointestinal: Negative.    Genitourinary: Negative.    Musculoskeletal: Negative.    Skin: Negative.    Neurological: Negative.    Endo/Heme/Allergies: Negative.    Psychiatric/Behavioral: Negative.    All other systems reviewed and are negative.         Objective:     BP (!) 90/60   Ht 1.651 m (5' 5\")   Wt 64.5 kg (142 lb 3.2 oz)   LMP  (LMP Unknown)   BMI 23.66 kg/m²      Physical Exam  Vitals reviewed.   Constitutional:       Appearance: She is well-developed.   HENT:      Head: Normocephalic and atraumatic.   Eyes:      Pupils: Pupils are equal, round, and reactive to light.   Neck:      Thyroid: No thyromegaly.   Cardiovascular:      Rate and Rhythm: Normal rate and regular rhythm.      Heart sounds: Normal heart sounds.   Pulmonary:      Effort: Pulmonary effort is normal. No respiratory distress.      Breath sounds: Normal breath sounds.   Abdominal:      General: Bowel sounds are normal. There is no distension.      Palpations: Abdomen is soft.      Tenderness: There is no abdominal tenderness.   Genitourinary:     Exam position: Supine.      Labia:         Right: No rash or tenderness.         Left: No rash or tenderness.       Vagina: " Normal. No signs of injury and foreign body. No vaginal discharge or erythema.      Cervix: No cervical motion tenderness.      Uterus: Enlarged (Gravid, uterus c/w 17-18 wk size). Not deviated and not tender.       Adnexa:         Right: No mass or tenderness.          Left: No mass or tenderness.     Musculoskeletal:      Cervical back: Normal range of motion and neck supple.   Skin:     General: Skin is warm and dry.      Findings: No erythema.   Neurological:      Mental Status: She is alert.      Deep Tendon Reflexes: Reflexes are normal and symmetric.   Psychiatric:         Behavior: Behavior normal.         Thought Content: Thought content normal.                 Assessment/Plan:        1. History of C/S x 1 for NRFHT   - TOLAC consent given today - pt to talk to partner and let us know     2. Supervision of high risk pregnancy, antepartum, second trimester  - F/u 4 wk, US 2 wk  - PREG CNTR PRENATAL PN; Future  - US-OB 2ND 3RD TRI COMPLETE; Future  - THINPREP RFLX HPV ASCUS W/CTNG; Future  - URINE DRUG SCREEN W/CONF (AR); Future  - HEP C VIRUS ANTIBODY; Future  - POCT Urinalysis

## 2021-05-07 NOTE — PROGRESS NOTES
Pt. Here for NOB visit.  # [phone]  LMP unknown.  Pt had a DUB visit on 4/23/2021  Last pap smear 12/26/2017 WNL  Pt. States having bad headaches and cramping here and there, and heartburn.   Pharmacy verified.   Chaperone offered and not needed.

## 2021-05-11 ENCOUNTER — HOSPITAL ENCOUNTER (OUTPATIENT)
Dept: LAB | Facility: MEDICAL CENTER | Age: 26
End: 2021-05-11
Attending: PHYSICIAN ASSISTANT
Payer: COMMERCIAL

## 2021-05-11 DIAGNOSIS — O09.92 SUPERVISION OF HIGH RISK PREGNANCY, ANTEPARTUM, SECOND TRIMESTER: ICD-10-CM

## 2021-05-11 LAB
ABO GROUP BLD: NORMAL
APPEARANCE UR: CLEAR
BASOPHILS # BLD AUTO: 0.1 % (ref 0–1.8)
BASOPHILS # BLD: 0.01 K/UL (ref 0–0.12)
BILIRUB UR QL STRIP.AUTO: NEGATIVE
BLD GP AB SCN SERPL QL: NORMAL
COLOR UR: YELLOW
EOSINOPHIL # BLD AUTO: 0.13 K/UL (ref 0–0.51)
EOSINOPHIL NFR BLD: 1.6 % (ref 0–6.9)
ERYTHROCYTE [DISTWIDTH] IN BLOOD BY AUTOMATED COUNT: 45.7 FL (ref 35.9–50)
GLUCOSE UR STRIP.AUTO-MCNC: NEGATIVE MG/DL
HBV SURFACE AG SER QL: ABNORMAL
HCT VFR BLD AUTO: 37.3 % (ref 37–47)
HCV AB SER QL: NORMAL
HGB BLD-MCNC: 12.3 G/DL (ref 12–16)
HIV 1+2 AB+HIV1 P24 AG SERPL QL IA: NORMAL
IMM GRANULOCYTES # BLD AUTO: 0.03 K/UL (ref 0–0.11)
IMM GRANULOCYTES NFR BLD AUTO: 0.4 % (ref 0–0.9)
KETONES UR STRIP.AUTO-MCNC: NEGATIVE MG/DL
LEUKOCYTE ESTERASE UR QL STRIP.AUTO: NEGATIVE
LYMPHOCYTES # BLD AUTO: 1.33 K/UL (ref 1–4.8)
LYMPHOCYTES NFR BLD: 16.2 % (ref 22–41)
MCH RBC QN AUTO: 31.1 PG (ref 27–33)
MCHC RBC AUTO-ENTMCNC: 33 G/DL (ref 33.6–35)
MCV RBC AUTO: 94.2 FL (ref 81.4–97.8)
MICRO URNS: ABNORMAL
MONOCYTES # BLD AUTO: 0.33 K/UL (ref 0–0.85)
MONOCYTES NFR BLD AUTO: 4 % (ref 0–13.4)
NEUTROPHILS # BLD AUTO: 6.36 K/UL (ref 2–7.15)
NEUTROPHILS NFR BLD: 77.7 % (ref 44–72)
NITRITE UR QL STRIP.AUTO: NEGATIVE
NRBC # BLD AUTO: 0 K/UL
NRBC BLD-RTO: 0 /100 WBC
PH UR STRIP.AUTO: 6.5 [PH] (ref 5–8)
PLATELET # BLD AUTO: 266 K/UL (ref 164–446)
PMV BLD AUTO: 9.9 FL (ref 9–12.9)
PROT UR QL STRIP: NEGATIVE MG/DL
RBC # BLD AUTO: 3.96 M/UL (ref 4.2–5.4)
RBC UR QL AUTO: NEGATIVE
RH BLD: NORMAL
RUBV AB SER QL: 27.6 IU/ML
SP GR UR STRIP.AUTO: 1.02
TREPONEMA PALLIDUM IGG+IGM AB [PRESENCE] IN SERUM OR PLASMA BY IMMUNOASSAY: ABNORMAL
UROBILINOGEN UR STRIP.AUTO-MCNC: 0.2 MG/DL
WBC # BLD AUTO: 8.2 K/UL (ref 4.8–10.8)

## 2021-05-13 LAB
AMPHET CTO UR CFM-MCNC: NEGATIVE NG/ML
BARBITURATES CTO UR CFM-MCNC: NEGATIVE NG/ML
BENZODIAZ CTO UR CFM-MCNC: NEGATIVE NG/ML
CANNABINOIDS CTO UR CFM-MCNC: POSITIVE NG/ML
COCAINE CTO UR CFM-MCNC: NEGATIVE NG/ML
DRUG COMMENT 753798: NORMAL
METHADONE CTO UR CFM-MCNC: NEGATIVE NG/ML
OPIATES CTO UR CFM-MCNC: NEGATIVE NG/ML
PCP CTO UR CFM-MCNC: NEGATIVE NG/ML
PROPOXYPH CTO UR CFM-MCNC: NEGATIVE NG/ML

## 2021-05-15 LAB — THC UR CFM-MCNC: 60 NG/ML

## 2021-05-17 PROBLEM — F12.90 MARIJUANA USE: Status: ACTIVE | Noted: 2021-05-17

## 2021-05-21 ENCOUNTER — APPOINTMENT (OUTPATIENT)
Dept: RADIOLOGY | Facility: IMAGING CENTER | Age: 26
End: 2021-05-21
Attending: PHYSICIAN ASSISTANT

## 2021-05-21 DIAGNOSIS — O09.92 SUPERVISION OF HIGH RISK PREGNANCY, ANTEPARTUM, SECOND TRIMESTER: ICD-10-CM

## 2021-05-21 PROCEDURE — 76805 OB US >/= 14 WKS SNGL FETUS: CPT | Mod: TC | Performed by: PHYSICIAN ASSISTANT

## 2021-05-21 RX ORDER — METRONIDAZOLE 500 MG/1
500 TABLET ORAL 2 TIMES DAILY
Qty: 14 TABLET | Refills: 0 | Status: SHIPPED | OUTPATIENT
Start: 2021-05-21 | End: 2021-05-28

## 2021-06-04 ENCOUNTER — ROUTINE PRENATAL (OUTPATIENT)
Dept: OBGYN | Facility: CLINIC | Age: 26
End: 2021-06-04

## 2021-06-04 VITALS — BODY MASS INDEX: 24.43 KG/M2 | DIASTOLIC BLOOD PRESSURE: 58 MMHG | SYSTOLIC BLOOD PRESSURE: 90 MMHG | WEIGHT: 146.8 LBS

## 2021-06-04 DIAGNOSIS — O09.92 SUPERVISION OF HIGH RISK PREGNANCY, ANTEPARTUM, SECOND TRIMESTER: ICD-10-CM

## 2021-06-04 PROCEDURE — 90040 PR PRENATAL FOLLOW UP: CPT | Performed by: PHYSICIAN ASSISTANT

## 2021-06-04 NOTE — PROGRESS NOTES
"Pt has no complaints with cramping, bleeding or pain though pt has had worsening heartburn, so remedies d/w - call if not improving. +FM. PNL, AFP, PAP, GC/CT, US wnl - pt notified of results and d/w pt risk of THC during pregnancy, as UDS + marijuana. Pt states she takes CPD edibles \"now and then\" when she has hip pain, though not daily. D/w risks to fetus during pregnancy, though pt trying to limit use. 1hr GTT next visit. RTC 4 wk or sooner prn.   "

## 2021-06-04 NOTE — PROGRESS NOTES
Pt. Here for OB/FU. Reports Good FM.   U/S wad done on  5/21/2021  Good # [phone]  Pt. States no complaints.   Pharmacy verified.   Chaperone offered and not needed.

## 2021-06-30 ENCOUNTER — ROUTINE PRENATAL (OUTPATIENT)
Dept: OBGYN | Facility: CLINIC | Age: 26
End: 2021-06-30

## 2021-06-30 VITALS — BODY MASS INDEX: 25.29 KG/M2 | WEIGHT: 152 LBS | DIASTOLIC BLOOD PRESSURE: 48 MMHG | SYSTOLIC BLOOD PRESSURE: 100 MMHG

## 2021-06-30 DIAGNOSIS — O09.92 SUPERVISION OF HIGH RISK PREGNANCY, ANTEPARTUM, SECOND TRIMESTER: Primary | ICD-10-CM

## 2021-06-30 PROCEDURE — 90040 PR PRENATAL FOLLOW UP: CPT | Performed by: NURSE PRACTITIONER

## 2021-06-30 NOTE — PROGRESS NOTES
Having some lower back pain but otherwise feeling well.  Discussed pregnancy belt, heat, ice and tylenol.  Given 3rd trimester labs with instructions.  Still trying to decide on TOLAC vs repeat .  RTC 2 weeks or PRN.

## 2021-06-30 NOTE — PROGRESS NOTES
Pt here today for OB follow up  Reports +FM  WT: 152 lb  BP:  100/48  Preferred pharmacy verified with pt.  Pt states no complaints or concerns today  3rd trimester labs ordered today, instructions given    Good # [phone]

## 2021-07-13 ENCOUNTER — HOSPITAL ENCOUNTER (OUTPATIENT)
Dept: LAB | Facility: MEDICAL CENTER | Age: 26
End: 2021-07-13
Attending: NURSE PRACTITIONER
Payer: COMMERCIAL

## 2021-07-13 DIAGNOSIS — O09.92 SUPERVISION OF HIGH RISK PREGNANCY, ANTEPARTUM, SECOND TRIMESTER: ICD-10-CM

## 2021-07-13 LAB
ERYTHROCYTE [DISTWIDTH] IN BLOOD BY AUTOMATED COUNT: 42.3 FL (ref 35.9–50)
GLUCOSE 1H P 50 G GLC PO SERPL-MCNC: 112 MG/DL (ref 70–139)
HCT VFR BLD AUTO: 36.6 % (ref 37–47)
HGB BLD-MCNC: 12.1 G/DL (ref 12–16)
MCH RBC QN AUTO: 31.4 PG (ref 27–33)
MCHC RBC AUTO-ENTMCNC: 33.1 G/DL (ref 33.6–35)
MCV RBC AUTO: 95.1 FL (ref 81.4–97.8)
PLATELET # BLD AUTO: 222 K/UL (ref 164–446)
PMV BLD AUTO: 9.8 FL (ref 9–12.9)
RBC # BLD AUTO: 3.85 M/UL (ref 4.2–5.4)
TREPONEMA PALLIDUM IGG+IGM AB [PRESENCE] IN SERUM OR PLASMA BY IMMUNOASSAY: NORMAL
WBC # BLD AUTO: 8.5 K/UL (ref 4.8–10.8)

## 2021-07-15 ENCOUNTER — ROUTINE PRENATAL (OUTPATIENT)
Dept: OBGYN | Facility: CLINIC | Age: 26
End: 2021-07-15

## 2021-07-15 VITALS — DIASTOLIC BLOOD PRESSURE: 60 MMHG | BODY MASS INDEX: 25.93 KG/M2 | SYSTOLIC BLOOD PRESSURE: 114 MMHG | WEIGHT: 155.8 LBS

## 2021-07-15 DIAGNOSIS — O09.92 SUPERVISION OF HIGH RISK PREGNANCY, ANTEPARTUM, SECOND TRIMESTER: ICD-10-CM

## 2021-07-15 PROCEDURE — 90471 IMMUNIZATION ADMIN: CPT | Performed by: PHYSICIAN ASSISTANT

## 2021-07-15 PROCEDURE — 90715 TDAP VACCINE 7 YRS/> IM: CPT | Performed by: PHYSICIAN ASSISTANT

## 2021-07-15 PROCEDURE — 90040 PR PRENATAL FOLLOW UP: CPT | Performed by: PHYSICIAN ASSISTANT

## 2021-07-15 RX ORDER — FLUCONAZOLE 150 MG/1
150 TABLET ORAL ONCE
Qty: 2 TABLET | Refills: 0 | Status: SHIPPED | OUTPATIENT
Start: 2021-07-15 | End: 2021-07-15

## 2021-07-15 NOTE — LETTER
"Count Your Baby's Movements  Another step to a healthy delivery    Tony Foreman              Dept: [phone]    How Many Weeks Pregnant? 28w3d    Date to Begin Countin/15/2021              How to use this chart    One way for your physician to keep track of your baby's health is by knowing how often the baby moves (or \"kicks\") in your womb.  You can help your physician to do this by using this chart every day.    Every day, you should see how many hours it takes for your baby to move 10 times.  Start in the morning, as soon as you get up.    · First, write down the time your baby moves until you get to 10.  · Check off one box every time your baby moves until you get to 10.  · Write down the time you finished counting in the last column.  · Total how long it took to count up all 10 movements.  · Finally, fill in the box that shows how long this took.  After counting 10 movements, you no longer have to count any more that day.  The next morning, just start counting again as soon as you get up.    What should you call a \"movement\"?  It is hard to say, because it will feel different from one mother to another and from one pregnancy to the next.  The important thing is that you count the movements the same way throughout your pregnancy.  If you have more questions, you should ask your physician.    Count carefully every day!  SAMPLE:  Week 28    How many hours did it take to feel 10 movements?       Start  Time     1     2     3     4     5     6     7     8     9     10   Finish Time   Mon 8:20 ·  ·  ·  ·  ·  ·  ·  ·  ·  ·  11:40                  Sat               Sun                 IMPORTANT: You should contact your physician if it takes more than two hours for you to feel 10 movements.  Each morning, write down the time and start to count the movements of your baby.  Keep track by checking off one box every time you feel one movement.  When you have " "felt 10 \"kicks\", write down the time you finished counting in the last column.  Then fill in the   box (over the check benja) for the number of hours it took.  Be sure to read the complete instructions on the previous page.            "

## 2021-07-15 NOTE — LETTER
"Count Your Baby's Movements  Another step to a healthy delivery    A Epic Dress Re Test             Dept: [phone]    How Many Weeks Pregnant? Unknown    Date to Begin Countinw3d              How to use this chart    One way for your physician to keep track of your baby's health is by knowing how often the baby moves (or \"kicks\") in your womb.  You can help your physician to do this by using this chart every day.    Every day, you should see how many hours it takes for your baby to move 10 times.  Start in the morning, as soon as you get up.    · First, write down the time your baby moves until you get to 10.  · Check off one box every time your baby moves until you get to 10.  · Write down the time you finished counting in the last column.  · Total how long it took to count up all 10 movements.  · Finally, fill in the box that shows how long this took.  After counting 10 movements, you no longer have to count any more that day.  The next morning, just start counting again as soon as you get up.    What should you call a \"movement\"?  It is hard to say, because it will feel different from one mother to another and from one pregnancy to the next.  The important thing is that you count the movements the same way throughout your pregnancy.  If you have more questions, you should ask your physician.    Count carefully every day!  SAMPLE:  Week 28    How many hours did it take to feel 10 movements?       Start  Time     1     2     3     4     5     6     7     8     9     10   Finish Time   Mon 8:20 ·  ·  ·  ·  ·  ·  ·  ·  ·  ·  11:40                  Sat               Sun                 IMPORTANT: You should contact your physician if it takes more than two hours for you to feel 10 movements.  Each morning, write down the time and start to count the movements of your baby.  Keep track by checking off one box every time you feel one movement.  When you " "have felt 10 \"kicks\", write down the time you finished counting in the last column.  Then fill in the   box (over the check benja) for the number of hours it took.  Be sure to read the complete instructions on the previous page.            "

## 2021-07-15 NOTE — NON-PROVIDER
Pt. Here for OB/FU. Reports Good FM.   Good # [phone]  Pt. Denies VB, LOF, or UC's.   Pharmacy verified.   Chaperone offered and not indicated  Pt states yellowish discharge with a slight odor.  BLANCA sheet given today along with instructions.  BTL would like to think about it.  Tdap today  Tdap vaccine given. Right Deltoid. Screening checklist reviewed with patient. VIS given. Pt Tolerated? Yes   Verified by Dinorah GAVIRIA

## 2021-07-15 NOTE — PROGRESS NOTES
Pt has no complaints with cramping, UCs, VB, LOF though pt has had vag dc with pain. +FM. 1hr GTT, CBC, T pallidium wnl. TDaP given today. Unsure about BTL - will let us know by next visit. Wet mt: +yeast. Diflucan rx called in today, risks d/w pt today.  RTC 2 wk or sooner prn.

## 2021-07-30 ENCOUNTER — ROUTINE PRENATAL (OUTPATIENT)
Dept: OBGYN | Facility: CLINIC | Age: 26
End: 2021-07-30

## 2021-07-30 ENCOUNTER — TELEPHONE (OUTPATIENT)
Dept: OBGYN | Facility: CLINIC | Age: 26
End: 2021-07-30

## 2021-07-30 VITALS — SYSTOLIC BLOOD PRESSURE: 106 MMHG | BODY MASS INDEX: 26.46 KG/M2 | WEIGHT: 159 LBS | DIASTOLIC BLOOD PRESSURE: 60 MMHG

## 2021-07-30 DIAGNOSIS — O09.92 SUPERVISION OF HIGH RISK PREGNANCY, ANTEPARTUM, SECOND TRIMESTER: ICD-10-CM

## 2021-07-30 PROCEDURE — 90040 PR PRENATAL FOLLOW UP: CPT | Performed by: OBSTETRICS & GYNECOLOGY

## 2021-07-30 NOTE — TELEPHONE ENCOUNTER
Patient called today stating she might be 15 min late. Let her know that we allow a 10 min latesha period. Let her know that she will be rescheduled if she is more then 10 min late. Patient understood and had no further questions.

## 2021-08-13 ENCOUNTER — TELEPHONE (OUTPATIENT)
Dept: OBGYN | Facility: CLINIC | Age: 26
End: 2021-08-13

## 2021-08-13 ENCOUNTER — HOSPITAL ENCOUNTER (EMERGENCY)
Facility: MEDICAL CENTER | Age: 26
End: 2021-08-13
Attending: OBSTETRICS & GYNECOLOGY | Admitting: OBSTETRICS & GYNECOLOGY
Payer: MEDICAID

## 2021-08-13 VITALS
BODY MASS INDEX: 26.49 KG/M2 | TEMPERATURE: 96.1 F | SYSTOLIC BLOOD PRESSURE: 109 MMHG | WEIGHT: 159 LBS | HEIGHT: 65 IN | DIASTOLIC BLOOD PRESSURE: 61 MMHG | RESPIRATION RATE: 18 BRPM | HEART RATE: 99 BPM | OXYGEN SATURATION: 96 %

## 2021-08-13 PROCEDURE — 59025 FETAL NON-STRESS TEST: CPT

## 2021-08-13 PROCEDURE — 99284 EMERGENCY DEPT VISIT MOD MDM: CPT

## 2021-08-13 PROCEDURE — 59025 FETAL NON-STRESS TEST: CPT | Mod: 26 | Performed by: OBSTETRICS & GYNECOLOGY

## 2021-08-13 PROCEDURE — 99281 EMR DPT VST MAYX REQ PHY/QHP: CPT | Mod: 25,GC | Performed by: OBSTETRICS & GYNECOLOGY

## 2021-08-13 ASSESSMENT — PAIN SCALES - GENERAL: PAINLEVEL: 0 - NO PAIN

## 2021-08-13 NOTE — TELEPHONE ENCOUNTER
Pt called concerned about baby movement. Stated she has not felt baby move since last night. Stated she drank orange juice and laying on her left side, but movements did not increase. Advised with Dr. Alvarez who recommended she be assessed by L&D. Pt verbalized understanding.

## 2021-08-13 NOTE — ED PROVIDER NOTES
OB H&P:    CC: Decreased fetal movement    HPI:  Ms. Tony Foreman is a 26 y.o.  @ 32w4d by US with decreased fetal movements today. She states that she has experienced occasional sporadic contractions but denies any vaginal bleeding, or loss of fluid. She notes that she has a history of 2 previous spontaneous abortions and a c/s due to concerning fetal heart tones. When she did not notice fetal movements by 1030 today she decided to call the nurse hotline and come into the hospital for evaluation. Upon arrival to the ED the patient began feeling + fetal movement and states she is very reassured by the activity on CEFM. She reports no additional significant past medical history. She denies any other history of surgery or procedures on her cervix.     Contractions: Yes , sporadic   Loss of fluid: No   Vaginal bleeding: No   Fetal movement: present      PNC with [facility]    PNL:  Rh+/-, RI, HIV neg, TrepAb neg, HBsAg NR, GC/CT neg/neg  Glucola: 112 mg/dL at 1 hour in 3rd trimester  GBS unknown      ROS:  Const: denies fevers, general concerns  CV/resp: reports no concerns  GI: denies abd pain, GI concerns  : see HPI  Neuro: denies HA/vision changes    OB History    Para Term  AB Living   4 1 1   2 1   SAB TAB Ectopic Molar Multiple Live Births   2       0 1      # Outcome Date GA Lbr Gurvinder/2nd Weight Sex Delivery Anes PTL Lv   4 Current            3 SAB 10/27/20 6w0d             Birth Comments: Pt states passed on its own.    2 Term 18 38w3d  2.535 kg (5 lb 9.4 oz) M CS-LTranv Spinal N ANNA      Complications: Fetal Intolerance   1 SAB 2018        FD      Birth Comments: Pt states passed on its own        GYN: denies STIs, no cervical procedures    Past Medical History:   Diagnosis Date   • Post partum depression 2018       Past Surgical History:   Procedure Laterality Date   • PRIMARY C SECTION N/A 2018    Procedure: PRIMARY C SECTION;  Surgeon: Rina Gaston M.D.;  Location:  "LABOR AND DELIVERY;  Service: Obstetrics         Current Outpatient Medications on File Prior to Encounter   Medication Sig Dispense Refill   • Prenatal MV-Min-Fe Fum-FA-DHA (PRENATAL 1 PO) Take  by mouth.         Family History   Problem Relation Age of Onset   • Diabetes Mother    • Hypertension Mother    • Diabetes Father    • Hypertension Father    • No Known Problems Sister    • Heart Disease Brother    • Hypertension Maternal Grandmother    • Diabetes Maternal Grandmother    • Hyperlipidemia Maternal Grandmother    • Hypertension Maternal Grandfather    • Diabetes Maternal Grandfather    • Hyperlipidemia Maternal Grandfather    • Hypertension Paternal Grandmother    • Diabetes Paternal Grandmother    • Hyperlipidemia Paternal Grandmother    • Hypertension Paternal Grandfather    • Diabetes Paternal Grandfather    • Hyperlipidemia Paternal Grandfather        Social History     Tobacco Use   • Smoking status: Never Smoker   • Smokeless tobacco: Never Used   Vaping Use   • Vaping Use: Never used   Substance Use Topics   • Alcohol use: No   • Drug use: No         PE:  Vitals:    21 1139 21 1140 21 1156   BP: 109/61     Pulse:  99    Resp:   18   Temp: (!) 35.6 °C (96.1 °F)     TempSrc: Temporal     SpO2: 96%     Weight: 72.1 kg (159 lb)     Height: 1.651 m (5' 5\")       gen: AAO, NAD  abd: soft, gravid, NT, EFW 2800 g  Ext: NT, no edema    FHT: 135/moderate variability/+ accels/ - decels  [place]: no contractions on external tocometry    A/P: 26 y.o.  @ 32w4d by US with decreased fetal movement this morning.     -Monitor vitals   -CEFM and tocometry   Given that the patient has been feeling fetal movements since arrival to the ED and reassuring CEFM and stable vitals and no contractions on external tocometry the patient is safe to be discharged home.      Scott Smith M.D.        "

## 2021-08-13 NOTE — PROGRESS NOTES
Pt presents to L&D with complaints of decreased FM since this morning. PT ambulated to [facility] 3 for assessment.     1138 TOCO and EFM applied, VSS. Audible FM heard by RN and pt, pt appears very relieved. PT states she has not felt the baby move like normal since she woke up, pt states she then went to work but started to get worried when baby didn't start moving like normal. Pt states she had to have an emergency C/S for non reassuring FHT. PT educated on POC. RN will update Dr. Crenshaw/Dr. Smith on pt status.     1158 Dr. Smith updated on pt status. Will be down to see shortly.     1235 Dr. Smith at bedside    1420 Dr. Gabriel updated on pt status, orders for discharge received.     1422 RN at bedside,  labor precautions given and all questions answered. PT discharged home in stable condition.

## 2021-08-16 ENCOUNTER — ROUTINE PRENATAL (OUTPATIENT)
Dept: OBGYN | Facility: CLINIC | Age: 26
End: 2021-08-16

## 2021-08-16 VITALS — BODY MASS INDEX: 27.29 KG/M2 | SYSTOLIC BLOOD PRESSURE: 112 MMHG | WEIGHT: 164 LBS | DIASTOLIC BLOOD PRESSURE: 68 MMHG

## 2021-08-16 DIAGNOSIS — Z3A.33 33 WEEKS GESTATION OF PREGNANCY: ICD-10-CM

## 2021-08-16 PROCEDURE — 90040 PR PRENATAL FOLLOW UP: CPT | Performed by: NURSE PRACTITIONER

## 2021-08-16 NOTE — PROGRESS NOTES
SUBJECTIVE:  Pt is a 26 y.o.   at 33w0d  gestation. Presents today for follow-up prenatal care. Reports no issues at this time.  Reports good  fetal movement. Denies regular cramping/contractions, bleeding or leaking of fluid. Denies dysuria, headaches, N/V. Generally feels well today.     OBJECTIVE:  - See prenatal vitals flow  -   Vitals:    21 1057   BP: 112/68   Weight: 74.4 kg (164 lb)                 ASSESSMENT:   - IUP at 33w0d    - S=D   -   Patient Active Problem List    Diagnosis Date Noted   • History of , desires repeat no BTL 2021   • Supervision of high risk pregnancy, antepartum, second trimester 2021         PLAN:  - S/sx pregnancy and labor warning signs vs general discomforts discussed  - Fetal movements and/or kick counts reviewed   - Adequate hydration reinforced  - Nutrition/exercise/vitamin education; continue PNV  - Desires repeat c/s without BTL, referral placed   - Plans unsure for contraception Pp: handout given and reviewed  - S/p Tdap vacc   - Growth US scheduled   - Anticipatory guidance given  - RTC in 2 weeks for follow-up prenatal care

## 2021-08-16 NOTE — PROGRESS NOTES
OB follow up   + fetal movement.  No VB, LOF or UC's.  [phone] (home)   Preferred pharmacy confirmed.

## 2021-08-16 NOTE — NON-PROVIDER
SUBJECTIVE:  Pt is a 26 y.o.   at 33w0d  gestation. Presents today for follow-up prenatal care. Reports no issues at this time.  Reports good  fetal movement. Denies regular cramping/contractions, bleeding or leaking of fluid. Denies dysuria, headaches, N/V. Generally feels well today. Patient went to L&D on 21 for decreased fetal movement, no complaints since then.    OBJECTIVE:  - See prenatal vitals flow  -   Vitals:    21 1057   BP: 112/68   Weight: 74.4 kg (164 lb)                 ASSESSMENT:   - IUP at 33w0d    - S=D   -   Patient Active Problem List    Diagnosis Date Noted   • History of , desires repeat no BTL 2021   • Supervision of high risk pregnancy, antepartum, second trimester 2021         PLAN:  - S/sx pregnancy and labor warning signs vs general discomforts discussed  - Fetal movements and/or kick counts reviewed   - Adequate hydration reinforced  - Nutrition/exercise/vitamin education; continue PNV  - Plans unsure for contraception Pp: handout given and reviewed  - Anticipatory guidance given  - Desires repeat c/s without BTL  - Growth US scheduled  - RTC in 2 weeks for follow-up prenatal care

## 2021-08-18 ENCOUNTER — APPOINTMENT (OUTPATIENT)
Dept: RADIOLOGY | Facility: IMAGING CENTER | Age: 26
End: 2021-08-18
Attending: OBSTETRICS & GYNECOLOGY

## 2021-08-18 DIAGNOSIS — O09.92 SUPERVISION OF HIGH RISK PREGNANCY, ANTEPARTUM, SECOND TRIMESTER: ICD-10-CM

## 2021-08-18 PROCEDURE — 76816 OB US FOLLOW-UP PER FETUS: CPT | Mod: TC | Performed by: OBSTETRICS & GYNECOLOGY

## 2021-09-02 ENCOUNTER — ROUTINE PRENATAL (OUTPATIENT)
Dept: OBGYN | Facility: CLINIC | Age: 26
End: 2021-09-02
Payer: MEDICAID

## 2021-09-02 VITALS — DIASTOLIC BLOOD PRESSURE: 68 MMHG | SYSTOLIC BLOOD PRESSURE: 108 MMHG | WEIGHT: 166.4 LBS | BODY MASS INDEX: 27.69 KG/M2

## 2021-09-02 DIAGNOSIS — O09.899 SUPERVISION OF OTHER HIGH RISK PREGNANCY, ANTEPARTUM: Primary | ICD-10-CM

## 2021-09-02 PROCEDURE — 90040 PR PRENATAL FOLLOW UP: CPT | Performed by: NURSE PRACTITIONER

## 2021-09-02 NOTE — PROGRESS NOTES
Complains of achy tired legs.  Discussed water intake, really only drinking 1-1.5 L per day. Discussed increased water intake, good diet, daily stretching, and Calcium/Magnesium supplementation  GBS next visit  Recent growth scan was 52.8%, concordant with dates  Has C/S scheduled 9/28/2021, no BTL  All caught up with labs and US  Labor precautions reviewed, all questions answered    Anna Daugherty CNM, APRN

## 2021-09-02 NOTE — PROGRESS NOTES
Pt. Here for OB/FU. Reports Good FM.   Good # [phone]  Pt states having cramping that comes and goes.   Pharmacy verified.   Chaperone offered and not needed.   C/S on 9/28/2021 @ 0930 am, pt notified and given instructions today.

## 2021-09-13 ENCOUNTER — ROUTINE PRENATAL (OUTPATIENT)
Dept: OBGYN | Facility: CLINIC | Age: 26
End: 2021-09-13
Payer: MEDICAID

## 2021-09-13 VITALS — BODY MASS INDEX: 28.54 KG/M2 | DIASTOLIC BLOOD PRESSURE: 60 MMHG | SYSTOLIC BLOOD PRESSURE: 108 MMHG | WEIGHT: 171.5 LBS

## 2021-09-13 DIAGNOSIS — O09.899 SUPERVISION OF OTHER HIGH RISK PREGNANCY, ANTEPARTUM: ICD-10-CM

## 2021-09-13 PROCEDURE — 90040 PR PRENATAL FOLLOW UP: CPT | Performed by: PHYSICIAN ASSISTANT

## 2021-09-13 NOTE — PROGRESS NOTES
Pt. here for Ob f/u and GBS today. Good # [phone]  Good FM  Pt states having Roane Watt.   Pharmacy verified.   Chaperone offered and not needed.   C/S on 9/28/2021 @ 0912 am, pt aware.

## 2021-09-13 NOTE — PROGRESS NOTES
Pt has no complaints with strong or regular cramping, UCs, VB, LOF. +FM. GBS done today. Pt was supposed to have Preop today but was rescheduled, so pt aware she will meet Dr Giron in L&D on 9/28. All questions d/w pt. PTL precautions reviewed. Cervix: Cl/60/-2, post, med, vtx. RTC 1 wk or sooner prn. Daily FKC recommended.

## 2021-09-22 ENCOUNTER — PRE-ADMISSION TESTING (OUTPATIENT)
Dept: ADMISSIONS | Facility: MEDICAL CENTER | Age: 26
End: 2021-09-22
Attending: OBSTETRICS & GYNECOLOGY
Payer: MEDICAID

## 2021-09-22 ENCOUNTER — ROUTINE PRENATAL (OUTPATIENT)
Dept: OBGYN | Facility: CLINIC | Age: 26
End: 2021-09-22
Payer: MEDICAID

## 2021-09-22 VITALS — SYSTOLIC BLOOD PRESSURE: 110 MMHG | WEIGHT: 171 LBS | BODY MASS INDEX: 28.46 KG/M2 | DIASTOLIC BLOOD PRESSURE: 62 MMHG

## 2021-09-22 DIAGNOSIS — O09.899 SUPERVISION OF OTHER HIGH RISK PREGNANCY, ANTEPARTUM: Primary | ICD-10-CM

## 2021-09-22 PROCEDURE — 90040 PR PRENATAL FOLLOW UP: CPT | Performed by: NURSE PRACTITIONER

## 2021-09-22 NOTE — PROGRESS NOTES
OB follow up   + fetal movement.  No VB, LOF or UC's.  Phone # [phone]  Preferred pharmacy confirmed.  I called [facility] to get GBS result. No result found or record of receiving it. Needs repeat today

## 2021-09-22 NOTE — PROGRESS NOTES
No concerns today.  Documented her completed covid vaccine series  GBS recollected as no result on file  Reviewed  section plan.  Labour precautions discussed.  RTC PRN

## 2021-09-22 NOTE — NON-PROVIDER
SUBJECTIVE:  Pt is a 26 y.o.   at 38w2d  gestation. Presents today for follow-up prenatal care. Reports no issues at this time.  Reports good  fetal movement. Denies regular cramping/contractions, bleeding or leaking of fluid. Denies dysuria, headaches, N/V. Generally feels well today.     OBJECTIVE:  - See prenatal vitals flow  -   Vitals:    21 0938   BP: 110/62   Weight: 77.6 kg (171 lb)                 ASSESSMENT:   - IUP at 38w2d    - S=D   - Repeat    Patient Active Problem List    Diagnosis Date Noted   • Supervision of other high risk pregnancy, antepartum 2021   • History of , desires repeat no BTL 2021         PLAN:  - S/sx pregnancy and labor warning signs vs general discomforts discussed  - Fetal movements and/or kick counts reviewed   - Adequate hydration reinforced  - S/p Tdap vacc today   - GBS collected   - Anticipatory guidance given  -  is scheduled 21

## 2021-09-25 ENCOUNTER — HOSPITAL ENCOUNTER (OUTPATIENT)
Dept: OBGYN | Facility: MEDICAL CENTER | Age: 26
End: 2021-09-25
Attending: OBSTETRICS & GYNECOLOGY | Admitting: OBSTETRICS & GYNECOLOGY
Payer: MEDICAID

## 2021-09-25 LAB
SARS-COV-2 RNA RESP QL NAA+PROBE: NOTDETECTED
SPECIMEN SOURCE: NORMAL

## 2021-09-25 PROCEDURE — U0005 INFEC AGEN DETEC AMPLI PROBE: HCPCS

## 2021-09-25 PROCEDURE — U0003 INFECTIOUS AGENT DETECTION BY NUCLEIC ACID (DNA OR RNA); SEVERE ACUTE RESPIRATORY SYNDROME CORONAVIRUS 2 (SARS-COV-2) (CORONAVIRUS DISEASE [COVID-19]), AMPLIFIED PROBE TECHNIQUE, MAKING USE OF HIGH THROUGHPUT TECHNOLOGIES AS DESCRIBED BY CMS-2020-01-R: HCPCS

## 2021-09-28 ENCOUNTER — ANESTHESIA EVENT (OUTPATIENT)
Dept: OBGYN | Facility: MEDICAL CENTER | Age: 26
End: 2021-09-28
Payer: MEDICAID

## 2021-09-28 ENCOUNTER — ANESTHESIA (OUTPATIENT)
Dept: OBGYN | Facility: MEDICAL CENTER | Age: 26
End: 2021-09-28
Payer: MEDICAID

## 2021-09-28 ENCOUNTER — HOSPITAL ENCOUNTER (INPATIENT)
Facility: MEDICAL CENTER | Age: 26
LOS: 2 days | End: 2021-09-30
Attending: OBSTETRICS & GYNECOLOGY | Admitting: OBSTETRICS & GYNECOLOGY
Payer: MEDICAID

## 2021-09-28 LAB
BASOPHILS # BLD AUTO: 0.2 % (ref 0–1.8)
BASOPHILS # BLD AUTO: 0.3 % (ref 0–1.8)
BASOPHILS # BLD: 0.03 K/UL (ref 0–0.12)
BASOPHILS # BLD: 0.03 K/UL (ref 0–0.12)
EOSINOPHIL # BLD AUTO: 0 K/UL (ref 0–0.51)
EOSINOPHIL # BLD AUTO: 0.12 K/UL (ref 0–0.51)
EOSINOPHIL NFR BLD: 0 % (ref 0–6.9)
EOSINOPHIL NFR BLD: 1.2 % (ref 0–6.9)
ERYTHROCYTE [DISTWIDTH] IN BLOOD BY AUTOMATED COUNT: 41.9 FL (ref 35.9–50)
ERYTHROCYTE [DISTWIDTH] IN BLOOD BY AUTOMATED COUNT: 43 FL (ref 35.9–50)
HCT VFR BLD AUTO: 32.5 % (ref 37–47)
HCT VFR BLD AUTO: 37.2 % (ref 37–47)
HGB BLD-MCNC: 11.3 G/DL (ref 12–16)
HGB BLD-MCNC: 13.1 G/DL (ref 12–16)
HOLDING TUBE BB 8507: NORMAL
IMM GRANULOCYTES # BLD AUTO: 0.07 K/UL (ref 0–0.11)
IMM GRANULOCYTES # BLD AUTO: 0.1 K/UL (ref 0–0.11)
IMM GRANULOCYTES NFR BLD AUTO: 0.5 % (ref 0–0.9)
IMM GRANULOCYTES NFR BLD AUTO: 1 % (ref 0–0.9)
LYMPHOCYTES # BLD AUTO: 0.92 K/UL (ref 1–4.8)
LYMPHOCYTES # BLD AUTO: 1.51 K/UL (ref 1–4.8)
LYMPHOCYTES NFR BLD: 15.5 % (ref 22–41)
LYMPHOCYTES NFR BLD: 6.8 % (ref 22–41)
MCH RBC QN AUTO: 31.4 PG (ref 27–33)
MCH RBC QN AUTO: 31.7 PG (ref 27–33)
MCHC RBC AUTO-ENTMCNC: 34.8 G/DL (ref 33.6–35)
MCHC RBC AUTO-ENTMCNC: 35.2 G/DL (ref 33.6–35)
MCV RBC AUTO: 89.2 FL (ref 81.4–97.8)
MCV RBC AUTO: 91.3 FL (ref 81.4–97.8)
MONOCYTES # BLD AUTO: 0.43 K/UL (ref 0–0.85)
MONOCYTES # BLD AUTO: 0.66 K/UL (ref 0–0.85)
MONOCYTES NFR BLD AUTO: 3.2 % (ref 0–13.4)
MONOCYTES NFR BLD AUTO: 6.8 % (ref 0–13.4)
NEUTROPHILS # BLD AUTO: 12.04 K/UL (ref 2–7.15)
NEUTROPHILS # BLD AUTO: 7.35 K/UL (ref 2–7.15)
NEUTROPHILS NFR BLD: 75.2 % (ref 44–72)
NEUTROPHILS NFR BLD: 89.3 % (ref 44–72)
NRBC # BLD AUTO: 0 K/UL
NRBC # BLD AUTO: 0 K/UL
NRBC BLD-RTO: 0 /100 WBC
NRBC BLD-RTO: 0 /100 WBC
PLATELET # BLD AUTO: 174 K/UL (ref 164–446)
PLATELET # BLD AUTO: 196 K/UL (ref 164–446)
PMV BLD AUTO: 9.5 FL (ref 9–12.9)
PMV BLD AUTO: 9.6 FL (ref 9–12.9)
RBC # BLD AUTO: 3.56 M/UL (ref 4.2–5.4)
RBC # BLD AUTO: 4.17 M/UL (ref 4.2–5.4)
WBC # BLD AUTO: 13.5 K/UL (ref 4.8–10.8)
WBC # BLD AUTO: 9.8 K/UL (ref 4.8–10.8)

## 2021-09-28 PROCEDURE — 160035 HCHG PACU - 1ST 60 MINS PHASE I: Performed by: OBSTETRICS & GYNECOLOGY

## 2021-09-28 PROCEDURE — 36415 COLL VENOUS BLD VENIPUNCTURE: CPT

## 2021-09-28 PROCEDURE — 700102 HCHG RX REV CODE 250 W/ 637 OVERRIDE(OP): Performed by: ANESTHESIOLOGY

## 2021-09-28 PROCEDURE — 160002 HCHG RECOVERY MINUTES (STAT): Performed by: OBSTETRICS & GYNECOLOGY

## 2021-09-28 PROCEDURE — 700101 HCHG RX REV CODE 250: Performed by: ANESTHESIOLOGY

## 2021-09-28 PROCEDURE — 700111 HCHG RX REV CODE 636 W/ 250 OVERRIDE (IP): Performed by: STUDENT IN AN ORGANIZED HEALTH CARE EDUCATION/TRAINING PROGRAM

## 2021-09-28 PROCEDURE — 700105 HCHG RX REV CODE 258: Performed by: ANESTHESIOLOGY

## 2021-09-28 PROCEDURE — 700111 HCHG RX REV CODE 636 W/ 250 OVERRIDE (IP): Performed by: ANESTHESIOLOGY

## 2021-09-28 PROCEDURE — 59515 CESAREAN DELIVERY: CPT | Performed by: OBSTETRICS & GYNECOLOGY

## 2021-09-28 PROCEDURE — 160009 HCHG ANES TIME/MIN: Performed by: OBSTETRICS & GYNECOLOGY

## 2021-09-28 PROCEDURE — A9270 NON-COVERED ITEM OR SERVICE: HCPCS | Performed by: ANESTHESIOLOGY

## 2021-09-28 PROCEDURE — 160048 HCHG OR STATISTICAL LEVEL 1-5: Performed by: OBSTETRICS & GYNECOLOGY

## 2021-09-28 PROCEDURE — 160041 HCHG SURGERY MINUTES - EA ADDL 1 MIN LEVEL 4: Performed by: OBSTETRICS & GYNECOLOGY

## 2021-09-28 PROCEDURE — 770002 HCHG ROOM/CARE - OB PRIVATE (112)

## 2021-09-28 PROCEDURE — 85025 COMPLETE CBC W/AUTO DIFF WBC: CPT

## 2021-09-28 PROCEDURE — 59514 CESAREAN DELIVERY ONLY: CPT | Mod: 80

## 2021-09-28 PROCEDURE — 160029 HCHG SURGERY MINUTES - 1ST 30 MINS LEVEL 4: Performed by: OBSTETRICS & GYNECOLOGY

## 2021-09-28 RX ORDER — SODIUM CHLORIDE, SODIUM GLUCONATE, SODIUM ACETATE, POTASSIUM CHLORIDE AND MAGNESIUM CHLORIDE 526; 502; 368; 37; 30 MG/100ML; MG/100ML; MG/100ML; MG/100ML; MG/100ML
1500 INJECTION, SOLUTION INTRAVENOUS ONCE
Status: COMPLETED | OUTPATIENT
Start: 2021-09-28 | End: 2021-09-28

## 2021-09-28 RX ORDER — KETOROLAC TROMETHAMINE 30 MG/ML
INJECTION, SOLUTION INTRAMUSCULAR; INTRAVENOUS PRN
Status: DISCONTINUED | OUTPATIENT
Start: 2021-09-28 | End: 2021-09-28 | Stop reason: SURG

## 2021-09-28 RX ORDER — HYDROMORPHONE HYDROCHLORIDE 1 MG/ML
0.4 INJECTION, SOLUTION INTRAMUSCULAR; INTRAVENOUS; SUBCUTANEOUS
Status: DISCONTINUED | OUTPATIENT
Start: 2021-09-28 | End: 2021-09-28 | Stop reason: HOSPADM

## 2021-09-28 RX ORDER — BUPIVACAINE HYDROCHLORIDE 7.5 MG/ML
INJECTION, SOLUTION INTRASPINAL
Status: COMPLETED | OUTPATIENT
Start: 2021-09-28 | End: 2021-09-28

## 2021-09-28 RX ORDER — HYDROMORPHONE HYDROCHLORIDE 1 MG/ML
0.2 INJECTION, SOLUTION INTRAMUSCULAR; INTRAVENOUS; SUBCUTANEOUS
Status: DISCONTINUED | OUTPATIENT
Start: 2021-09-28 | End: 2021-09-28 | Stop reason: HOSPADM

## 2021-09-28 RX ORDER — HALOPERIDOL 5 MG/ML
1 INJECTION INTRAMUSCULAR
Status: DISCONTINUED | OUTPATIENT
Start: 2021-09-28 | End: 2021-09-28 | Stop reason: HOSPADM

## 2021-09-28 RX ORDER — HYDROMORPHONE HYDROCHLORIDE 1 MG/ML
0.4 INJECTION, SOLUTION INTRAMUSCULAR; INTRAVENOUS; SUBCUTANEOUS
Status: DISCONTINUED | OUTPATIENT
Start: 2021-09-28 | End: 2021-09-29

## 2021-09-28 RX ORDER — SODIUM CHLORIDE, SODIUM LACTATE, POTASSIUM CHLORIDE, CALCIUM CHLORIDE 600; 310; 30; 20 MG/100ML; MG/100ML; MG/100ML; MG/100ML
INJECTION, SOLUTION INTRAVENOUS CONTINUOUS
Status: DISCONTINUED | OUTPATIENT
Start: 2021-09-28 | End: 2021-09-28 | Stop reason: HOSPADM

## 2021-09-28 RX ORDER — ONDANSETRON 2 MG/ML
INJECTION INTRAMUSCULAR; INTRAVENOUS PRN
Status: DISCONTINUED | OUTPATIENT
Start: 2021-09-28 | End: 2021-09-28 | Stop reason: SURG

## 2021-09-28 RX ORDER — OXYCODONE HCL 5 MG/5 ML
5 SOLUTION, ORAL ORAL
Status: DISCONTINUED | OUTPATIENT
Start: 2021-09-28 | End: 2021-09-28

## 2021-09-28 RX ORDER — SODIUM CHLORIDE, SODIUM GLUCONATE, SODIUM ACETATE, POTASSIUM CHLORIDE AND MAGNESIUM CHLORIDE 526; 502; 368; 37; 30 MG/100ML; MG/100ML; MG/100ML; MG/100ML; MG/100ML
INJECTION, SOLUTION INTRAVENOUS
Status: DISCONTINUED | OUTPATIENT
Start: 2021-09-28 | End: 2021-09-28 | Stop reason: SURG

## 2021-09-28 RX ORDER — DIPHENHYDRAMINE HYDROCHLORIDE 50 MG/ML
25 INJECTION INTRAMUSCULAR; INTRAVENOUS EVERY 6 HOURS PRN
Status: DISCONTINUED | OUTPATIENT
Start: 2021-09-28 | End: 2021-09-29

## 2021-09-28 RX ORDER — DEXAMETHASONE SODIUM PHOSPHATE 4 MG/ML
INJECTION, SOLUTION INTRA-ARTICULAR; INTRALESIONAL; INTRAMUSCULAR; INTRAVENOUS; SOFT TISSUE PRN
Status: DISCONTINUED | OUTPATIENT
Start: 2021-09-28 | End: 2021-09-28 | Stop reason: SURG

## 2021-09-28 RX ORDER — PHENYLEPHRINE HCL IN 0.9% NACL 0.5 MG/5ML
SYRINGE (ML) INTRAVENOUS PRN
Status: DISCONTINUED | OUTPATIENT
Start: 2021-09-28 | End: 2021-09-28 | Stop reason: SURG

## 2021-09-28 RX ORDER — MEPERIDINE HYDROCHLORIDE 25 MG/ML
12.5 INJECTION INTRAMUSCULAR; INTRAVENOUS; SUBCUTANEOUS
Status: DISCONTINUED | OUTPATIENT
Start: 2021-09-28 | End: 2021-09-28 | Stop reason: HOSPADM

## 2021-09-28 RX ORDER — DIPHENHYDRAMINE HYDROCHLORIDE 50 MG/ML
12.5 INJECTION INTRAMUSCULAR; INTRAVENOUS EVERY 6 HOURS PRN
Status: DISCONTINUED | OUTPATIENT
Start: 2021-09-28 | End: 2021-09-29

## 2021-09-28 RX ORDER — METOCLOPRAMIDE HYDROCHLORIDE 5 MG/ML
10 INJECTION INTRAMUSCULAR; INTRAVENOUS ONCE
Status: COMPLETED | OUTPATIENT
Start: 2021-09-28 | End: 2021-09-28

## 2021-09-28 RX ORDER — OXYCODONE HYDROCHLORIDE 10 MG/1
10 TABLET ORAL EVERY 4 HOURS PRN
Status: DISCONTINUED | OUTPATIENT
Start: 2021-09-28 | End: 2021-09-29

## 2021-09-28 RX ORDER — OXYTOCIN 10 [USP'U]/ML
INJECTION, SOLUTION INTRAMUSCULAR; INTRAVENOUS PRN
Status: DISCONTINUED | OUTPATIENT
Start: 2021-09-28 | End: 2021-09-28 | Stop reason: SURG

## 2021-09-28 RX ORDER — HYDROMORPHONE HYDROCHLORIDE 1 MG/ML
0.2 INJECTION, SOLUTION INTRAMUSCULAR; INTRAVENOUS; SUBCUTANEOUS
Status: DISCONTINUED | OUTPATIENT
Start: 2021-09-28 | End: 2021-09-29

## 2021-09-28 RX ORDER — OXYCODONE HYDROCHLORIDE 5 MG/1
5 TABLET ORAL EVERY 4 HOURS PRN
Status: DISCONTINUED | OUTPATIENT
Start: 2021-09-28 | End: 2021-09-29

## 2021-09-28 RX ORDER — ONDANSETRON 2 MG/ML
4 INJECTION INTRAMUSCULAR; INTRAVENOUS EVERY 6 HOURS PRN
Status: DISCONTINUED | OUTPATIENT
Start: 2021-09-28 | End: 2021-09-29

## 2021-09-28 RX ORDER — HYDROMORPHONE HYDROCHLORIDE 1 MG/ML
0.6 INJECTION, SOLUTION INTRAMUSCULAR; INTRAVENOUS; SUBCUTANEOUS
Status: DISCONTINUED | OUTPATIENT
Start: 2021-09-28 | End: 2021-09-28 | Stop reason: HOSPADM

## 2021-09-28 RX ORDER — OXYCODONE HCL 5 MG/5 ML
10 SOLUTION, ORAL ORAL
Status: DISCONTINUED | OUTPATIENT
Start: 2021-09-28 | End: 2021-09-28

## 2021-09-28 RX ORDER — KETOROLAC TROMETHAMINE 30 MG/ML
30 INJECTION, SOLUTION INTRAMUSCULAR; INTRAVENOUS EVERY 6 HOURS
Status: DISPENSED | OUTPATIENT
Start: 2021-09-28 | End: 2021-09-29

## 2021-09-28 RX ORDER — CITRIC ACID/SODIUM CITRATE 334-500MG
30 SOLUTION, ORAL ORAL ONCE
Status: COMPLETED | OUTPATIENT
Start: 2021-09-28 | End: 2021-09-28

## 2021-09-28 RX ORDER — DIPHENHYDRAMINE HYDROCHLORIDE 50 MG/ML
12.5 INJECTION INTRAMUSCULAR; INTRAVENOUS
Status: DISCONTINUED | OUTPATIENT
Start: 2021-09-28 | End: 2021-09-28 | Stop reason: HOSPADM

## 2021-09-28 RX ORDER — CEFAZOLIN SODIUM 1 G/3ML
INJECTION, POWDER, FOR SOLUTION INTRAMUSCULAR; INTRAVENOUS PRN
Status: DISCONTINUED | OUTPATIENT
Start: 2021-09-28 | End: 2021-09-28 | Stop reason: SURG

## 2021-09-28 RX ORDER — ONDANSETRON 2 MG/ML
4 INJECTION INTRAMUSCULAR; INTRAVENOUS
Status: DISCONTINUED | OUTPATIENT
Start: 2021-09-28 | End: 2021-09-28 | Stop reason: HOSPADM

## 2021-09-28 RX ORDER — MORPHINE SULFATE 0.5 MG/ML
INJECTION, SOLUTION EPIDURAL; INTRATHECAL; INTRAVENOUS
Status: COMPLETED | OUTPATIENT
Start: 2021-09-28 | End: 2021-09-28

## 2021-09-28 RX ORDER — ACETAMINOPHEN 500 MG
1000 TABLET ORAL EVERY 6 HOURS
Status: COMPLETED | OUTPATIENT
Start: 2021-09-28 | End: 2021-09-29

## 2021-09-28 RX ADMIN — CEFAZOLIN 2 G: 330 INJECTION, POWDER, FOR SOLUTION INTRAMUSCULAR; INTRAVENOUS at 09:45

## 2021-09-28 RX ADMIN — Medication 100 MCG: at 09:44

## 2021-09-28 RX ADMIN — KETOROLAC TROMETHAMINE 30 MG: 30 INJECTION, SOLUTION INTRAMUSCULAR; INTRAVENOUS at 18:04

## 2021-09-28 RX ADMIN — BUPIVACAINE HYDROCHLORIDE IN DEXTROSE 1.5 ML: 7.5 INJECTION, SOLUTION SUBARACHNOID at 09:41

## 2021-09-28 RX ADMIN — MORPHINE SULFATE 150 MCG: 0.5 INJECTION, SOLUTION EPIDURAL; INTRATHECAL; INTRAVENOUS at 09:41

## 2021-09-28 RX ADMIN — FENTANYL CITRATE 10 MCG: 50 INJECTION, SOLUTION INTRAMUSCULAR; INTRAVENOUS at 09:41

## 2021-09-28 RX ADMIN — OXYTOCIN 125 ML/HR: 10 INJECTION, SOLUTION INTRAMUSCULAR; INTRAVENOUS at 11:39

## 2021-09-28 RX ADMIN — OXYTOCIN 20 UNITS: 10 INJECTION, SOLUTION INTRAMUSCULAR; INTRAVENOUS at 10:15

## 2021-09-28 RX ADMIN — FAMOTIDINE 20 MG: 10 INJECTION INTRAVENOUS at 09:06

## 2021-09-28 RX ADMIN — KETOROLAC TROMETHAMINE 30 MG: 30 INJECTION, SOLUTION INTRAMUSCULAR at 10:48

## 2021-09-28 RX ADMIN — ACETAMINOPHEN 1000 MG: 500 TABLET ORAL at 12:44

## 2021-09-28 RX ADMIN — EPHEDRINE SULFATE 10 MG: 50 INJECTION, SOLUTION INTRAVENOUS at 09:47

## 2021-09-28 RX ADMIN — EPHEDRINE SULFATE 10 MG: 50 INJECTION, SOLUTION INTRAVENOUS at 09:58

## 2021-09-28 RX ADMIN — ACETAMINOPHEN 1000 MG: 500 TABLET ORAL at 18:48

## 2021-09-28 RX ADMIN — SODIUM CHLORIDE, SODIUM GLUCONATE, SODIUM ACETATE, POTASSIUM CHLORIDE AND MAGNESIUM CHLORIDE: 526; 502; 368; 37; 30 INJECTION, SOLUTION INTRAVENOUS at 09:39

## 2021-09-28 RX ADMIN — DEXAMETHASONE SODIUM PHOSPHATE 4 MG: 4 INJECTION, SOLUTION INTRA-ARTICULAR; INTRALESIONAL; INTRAMUSCULAR; INTRAVENOUS; SOFT TISSUE at 10:21

## 2021-09-28 RX ADMIN — Medication 100 MCG: at 09:50

## 2021-09-28 RX ADMIN — METOCLOPRAMIDE HYDROCHLORIDE 10 MG: 5 INJECTION INTRAMUSCULAR; INTRAVENOUS at 09:06

## 2021-09-28 RX ADMIN — SODIUM CHLORIDE, SODIUM GLUCONATE, SODIUM ACETATE, POTASSIUM CHLORIDE AND MAGNESIUM CHLORIDE: 526; 502; 368; 37; 30 INJECTION, SOLUTION INTRAVENOUS at 10:09

## 2021-09-28 RX ADMIN — SODIUM CHLORIDE, SODIUM GLUCONATE, SODIUM ACETATE, POTASSIUM CHLORIDE AND MAGNESIUM CHLORIDE 1500 ML: 526; 502; 368; 37; 30 INJECTION, SOLUTION INTRAVENOUS at 08:05

## 2021-09-28 RX ADMIN — SODIUM CITRATE AND CITRIC ACID MONOHYDRATE 30 ML: 500; 334 SOLUTION ORAL at 09:06

## 2021-09-28 RX ADMIN — ONDANSETRON 4 MG: 2 INJECTION INTRAMUSCULAR; INTRAVENOUS at 10:21

## 2021-09-28 ASSESSMENT — PATIENT HEALTH QUESTIONNAIRE - PHQ9
2. FEELING DOWN, DEPRESSED, IRRITABLE, OR HOPELESS: SEVERAL DAYS
9. THOUGHTS THAT YOU WOULD BE BETTER OFF DEAD, OR OF HURTING YOURSELF: NOT AT ALL
5. POOR APPETITE OR OVEREATING: NOT AT ALL
SUM OF ALL RESPONSES TO PHQ QUESTIONS 1-9: 5
8. MOVING OR SPEAKING SO SLOWLY THAT OTHER PEOPLE COULD HAVE NOTICED. OR THE OPPOSITE, BEING SO FIGETY OR RESTLESS THAT YOU HAVE BEEN MOVING AROUND A LOT MORE THAN USUAL: NOT AT ALL
SUM OF ALL RESPONSES TO PHQ9 QUESTIONS 1 AND 2: 2
7. TROUBLE CONCENTRATING ON THINGS, SUCH AS READING THE NEWSPAPER OR WATCHING TELEVISION: NOT AT ALL
1. LITTLE INTEREST OR PLEASURE IN DOING THINGS: SEVERAL DAYS
3. TROUBLE FALLING OR STAYING ASLEEP OR SLEEPING TOO MUCH: MORE THAN HALF THE DAYS
4. FEELING TIRED OR HAVING LITTLE ENERGY: SEVERAL DAYS
6. FEELING BAD ABOUT YOURSELF - OR THAT YOU ARE A FAILURE OR HAVE LET YOURSELF OR YOUR FAMILY DOWN: NOT AL ALL

## 2021-09-28 ASSESSMENT — LIFESTYLE VARIABLES
ALCOHOL_USE: NO
EVER_SMOKED: NEVER

## 2021-09-28 ASSESSMENT — PAIN DESCRIPTION - PAIN TYPE
TYPE: ACUTE PAIN
TYPE: SURGICAL PAIN
TYPE: SURGICAL PAIN
TYPE: ACUTE PAIN
TYPE: SURGICAL PAIN
TYPE: SURGICAL PAIN

## 2021-09-28 ASSESSMENT — PAIN SCALES - GENERAL: PAIN_LEVEL: 1

## 2021-09-28 NOTE — ANESTHESIA PROCEDURE NOTES
Spinal Block    Date/Time: 9/28/2021 9:41 AM  Performed by: Ramesh Simpson M.D.  Authorized by: Ramesh Simpson M.D.     Patient Location:  OR  Start Time:  9/28/2021 9:41 AM  End Time:  9/28/2021 9:42 AM  Reason for Block: primary anesthetic    patient identified, IV checked, site marked, risks and benefits discussed, surgical consent, monitors and equipment checked, pre-op evaluation and timeout performed    Patient Position:  Sitting  Prep: ChloraPrep, patient draped and sterile technique    Monitoring:  Blood pressure, continuous pulse oximetry and heart rate  Approach:  Midline  Location:  L2-3  Injection Technique:  Single-shot  Skin infiltration:  Lidocaine  Strength:  1%  Dose:  3ml  Needle Type:  Rachael  Needle Gauge:  25 G  CSF flowing pre/post injection:  Yes  Sensory Level:  T4

## 2021-09-28 NOTE — PROGRESS NOTES
"1200:Report received from Traci GOMEZ. Patient transported to unit via gurney. Patient appears calm an in no acute distress.  ID bands and cuddles verified. Assessment completed. Fundus firm, light lochia noted. Mepilex silver dressing in place to incision, dressing clean,dry,and intact. Alcantara cath in place. SCDs On and in place.  at bedside.Patient oriented to room and call light. Patient educated on keeping infant bundled up and/or skin-to-skin, safe sleep policy for infant, documenting infant void, stool, and feeding on clipboard, and infant feeding frequency, patient verbalizes understanding.  Plan of care discussed with patient; questions have been answered at this time. Patient needs have been met at this time. Patient encouraged to call when needing assistance. Call light within reach. Rounding in place. /74   Pulse 79   Temp 35.9 °C (96.7 °F) (Temporal)   Resp 16   Ht 1.651 m (5' 5\")   Wt 77.6 kg (171 lb)   LMP  (LMP Unknown)   SpO2 96%   Breastfeeding Yes   BMI 28.46 kg/m² .    "

## 2021-09-28 NOTE — PROGRESS NOTES
Pt  here for scheduled repeat C/S. Reports +FM. Denies LOF, bleeding or pain. Ambulated self to OR at 0935. Transported to PACU at 1054. After uncomplicated recovery, pt stable and transported to . Bedside report given to PATRICIA Duarte

## 2021-09-28 NOTE — ANESTHESIA POSTPROCEDURE EVALUATION
Patient: Tony Foreman    Procedure Summary     Date: 21 Room / Location: [facility] OR 01 / SURGERY [facility]    Anesthesia Start: 939 Anesthesia Stop:     Procedure:  SECTION, REPEAT Diagnosis:        delivery delivered      (Same, del)    Surgeons: Sally Giron D.O. Responsible Provider: Ramesh Simpson M.D.    Anesthesia Type: spinal ASA Status: 2          Final Anesthesia Type: spinal  Last vitals  BP   Blood Pressure: 111/68    Temp   36.3 °C (97.3 °F)    Pulse   87   Resp        SpO2   97 %      Anesthesia Post Evaluation    Patient location during evaluation: PACU  Patient participation: complete - patient participated  Level of consciousness: awake and alert  Pain score: 1    Airway patency: patent  Anesthetic complications: no  Cardiovascular status: hemodynamically stable  Respiratory status: acceptable  Hydration status: euvolemic    PONV: none          No complications documented.

## 2021-09-28 NOTE — ANESTHESIA TIME REPORT
Anesthesia Start and Stop Event Times     Date Time Event    2021 0935 Ready for Procedure     0939 Anesthesia Start     1057 Anesthesia Stop        Responsible Staff  21    Name Role Begin End    Ramesh Simpson M.D. Anesth 0939 1057        Preop Diagnosis (Free Text):  Pre-op Diagnosis     PREVIOUS  SECTION IUP 39.1 WEEKS        Preop Diagnosis (Codes):  Diagnosis Information     Diagnosis Code(s):  delivery delivered [O82]        Premium Reason  Non-Premium    Comments: JESUS

## 2021-09-28 NOTE — H&P
History and Physical    Tony Foreman is a 26 y.o. female  at 39w1d who presents for scheudled c/s.  Hx of c/s for FHT issues.  Does nto want to TOLAC does not want a BTL    Subjective:   CTXs: negative   Pain: negative  LOF: negative  Vaginal bleeding: negative   Fetal movement: positive    ROS: Pertinent positives documented in HPI and all other systems reviewed & are negative    OB History    Para Term  AB Living   4 1 1   2 1   SAB TAB Ectopic Molar Multiple Live Births   2       0 1      # Outcome Date GA Lbr Gurvinder/2nd Weight Sex Delivery Anes PTL Lv   4 Current            3 SAB 10/27/20 6w0d             Birth Comments: Pt states passed on its own.    2 Term 18 38w3d  2.535 kg (5 lb 9.4 oz) M CS-LTranv Spinal N ANNA      Complications: Fetal Intolerance   1 SAB 2018        FD      Birth Comments: Pt states passed on its own        PGYNHx: denies stds and fibroids or ovarian cysts    History reviewed. No pertinent past medical history.    Past Surgical History:   Procedure Laterality Date   • PRIMARY C SECTION N/A 2018    Procedure: PRIMARY C SECTION;  Surgeon: Rina Gaston M.D.;  Location: LABOR AND DELIVERY;  Service: Obstetrics         Current Facility-Administered Medications:   •  oxytocin (PITOCIN) 20 UNITS/1000ML LR, , , ,     Allergies: Patient has no known allergies.    Social History     Socioeconomic History   • Marital status: Single     Spouse name: Not on file   • Number of children: Not on file   • Years of education: Not on file   • Highest education level: Not on file   Occupational History   • Not on file   Tobacco Use   • Smoking status: Never Smoker   • Smokeless tobacco: Never Used   Vaping Use   • Vaping Use: Never used   Substance and Sexual Activity   • Alcohol use: No   • Drug use: No   • Sexual activity: Yes     Partners: Male     Birth control/protection: Condom   Other Topics Concern   • Not on file   Social History Narrative   • Not on file  "    Social Determinants of Health     Financial Resource Strain:    • Difficulty of Paying Living Expenses:    Food Insecurity:    • Worried About Running Out of Food in the Last Year:    • Ran Out of Food in the Last Year:    Transportation Needs:    • Lack of Transportation (Medical):    • Lack of Transportation (Non-Medical):    Physical Activity:    • Days of Exercise per Week:    • Minutes of Exercise per Session:    Stress:    • Feeling of Stress :    Social Connections:    • Frequency of Communication with Friends and Family:    • Frequency of Social Gatherings with Friends and Family:    • Attends Jain Services:    • Active Member of Clubs or Organizations:    • Attends Club or Organization Meetings:    • Marital Status:    Intimate Partner Violence:    • Fear of Current or Ex-Partner:    • Emotionally Abused:    • Physically Abused:    • Sexually Abused:          FamHx:   denies    Prenatal care with following problems:  Patient Active Problem List    Diagnosis Date Noted   • Supervision of other high risk pregnancy, antepartum 2021   • History of , desires repeat no BTL 2021         Objective:      /68   Pulse 87   Temp 36.3 °C (97.3 °F) (Temporal)   Ht 1.651 m (5' 5\")   Wt 77.6 kg (171 lb)   SpO2 97%     General:   no acute distress, alert, cooperative   Skin:   normal   HEENT:  EOMI           Abdomen:   soft, gravid, NT   EFW:  AGA   Pelvis:  adequate with gynecoid pelvis   FHT:  130 BPM  Accels +  Decels -  Variability mod  Category I   Uterine Size: S=D   Presentations: Cephalic     Lab Review  Lab:   Blood type: B     Recent Results (from the past 5880 hour(s))   AFP TETRA    Collection Time: 21  3:12 PM   Result Value Ref Range    AFP Value -Eia 39 ng/mL    AFP MOM Value 1.05     Ue3 Value 1.22 ng/mL    Ue3 Mom 1.01     Patient's hCG, 2nd Trimester 27888 IU/L    hCG MoM, 2nd Trimester 1.03     [place]sley Value -Eia 173 pg/mL    [place] Mom Value 1.12     Interpretation " Screen Neg     [age]W [age]    Maternal Weight 140.0 lbs.     Gest. Age on Collection Date 16 wks, 4 days     Gestational Age Based On Ultrasound     Multiple Pregnancy Tejeda     Race Nonblack     Insulin Dependent Diabetes No     Smoking No     Family Hx NTD No     Family Hx of Aneuploidy No     Specimen See Note     EER Quad, Maternal Serum See Note    POCT Urinalysis    Collection Time: 05/07/21  3:26 PM   Result Value Ref Range    POC Color      POC Appearance      POC Leukocyte Esterase negative Negative    POC Nitrites negative Negative    POC Urobiligen      POC Protein negative Negative mg/dL    POC Urine PH 6.0 5.0 - 8.0    POC Blood negative Negative    POC Specific Gravity <=1.005 <1.005 - >1.030    POC Ketones negative Negative mg/dL    POC Bilirubin      POC Glucose negative Negative mg/dL   THINPREP RFLX HPV ASCUS W/CTNG    Collection Time: 05/07/21  4:06 PM   Result Value Ref Range    Cytology Reg See Path Report     C. trachomatis by PCR Negative Negative    N. gonorrhoeae by PCR Negative Negative    Source Cervical    OP Prenatal Panel-Blood Bank    Collection Time: 05/11/21 12:50 PM   Result Value Ref Range    ABO Grouping Only B     Rh Grouping Only POS     Antibody Screen Scrn NEG    URINE DRUG SCREEN W/CONF (AR)    Collection Time: 05/11/21 12:55 PM   Result Value Ref Range    Urine Amphetamine-Methamphetam Negative Cutoff 300 ng/mL    Barbiturates Negative Cutoff 200 ng/mL    Benzodiazepines Negative Cutoff 200 ng/mL    Propoxyphene Negative Cutoff 300 ng/mL    Cocaine Metabolite Negative Cutoff 150 ng/mL    Methadone Negative Cutoff 150 ng/mL    Codeine-Morphine Negative Cutoff 300 ng/mL    Phencyclidine -Pcp Negative Cutoff 25 ng/mL    Cannabinoid Metab Positive Cutoff 20 ng/mL    Drug Comment Urine Drugs See Note    PREG CNTR PRENATAL PN    Collection Time: 05/11/21 12:55 PM   Result Value Ref Range    Color Yellow     Character Clear     Specific Gravity 1.019 <1.035     Ph 6.5 5.0 - 8.0    Glucose Negative Negative mg/dL    Ketones Negative Negative mg/dL    Protein Negative Negative mg/dL    Bilirubin Negative Negative    Urobilinogen, Urine 0.2 Negative    Nitrite Negative Negative    Leukocyte Esterase Negative Negative    Occult Blood Negative Negative    Micro Urine Req see below     WBC 8.2 4.8 - 10.8 K/uL    RBC 3.96 (L) 4.20 - 5.40 M/uL    Hemoglobin 12.3 12.0 - 16.0 g/dL    Hematocrit 37.3 37.0 - 47.0 %    MCV 94.2 81.4 - 97.8 fL    MCH 31.1 27.0 - 33.0 pg    MCHC 33.0 (L) 33.6 - 35.0 g/dL    RDW 45.7 35.9 - 50.0 fL    Platelet Count 266 164 - 446 K/uL    MPV 9.9 9.0 - 12.9 fL    Neutrophils-Polys 77.70 (H) 44.00 - 72.00 %    Lymphocytes 16.20 (L) 22.00 - 41.00 %    Monocytes 4.00 0.00 - 13.40 %    Eosinophils 1.60 0.00 - 6.90 %    Basophils 0.10 0.00 - 1.80 %    Immature Granulocytes 0.40 0.00 - 0.90 %    Nucleated RBC 0.00 /100 WBC    Neutrophils (Absolute) 6.36 2.00 - 7.15 K/uL    Lymphs (Absolute) 1.33 1.00 - 4.80 K/uL    Monos (Absolute) 0.33 0.00 - 0.85 K/uL    Eos (Absolute) 0.13 0.00 - 0.51 K/uL    Baso (Absolute) 0.01 0.00 - 0.12 K/uL    Immature Granulocytes (abs) 0.03 0.00 - 0.11 K/uL    NRBC (Absolute) 0.00 K/uL    Hepatitis B Surface Antigen Non-Reactive Non-Reactive    Rubella IgG Antibody 27.60 IU/mL    Syphilis, Treponemal Qual Non-Reactive Non-Reactive   HIV AG/AB COMBO ASSAY SCREENING    Collection Time: 05/11/21 12:55 PM   Result Value Ref Range    HIV Ag/Ab Combo Assay Non-Reactive Non Reactive   CANNABINOIDS, UR CONFIRM    Collection Time: 05/11/21 12:55 PM   Result Value Ref Range    Cannabinoids, Ur Drug Confirmation 60 ng/mL   HEP C VIRUS ANTIBODY    Collection Time: 05/11/21 12:56 PM   Result Value Ref Range    Hepatitis C Antibody Non-Reactive Non-Reactive   GLUCOSE 1HR GESTATIONAL    Collection Time: 07/13/21  8:44 AM   Result Value Ref Range    Glucose, Post Dose 112 70 - 139 mg/dL   T.PALLIDUM AB EIA    Collection Time: 07/13/21  8:45 AM    Result Value Ref Range    Syphilis, Treponemal Qual Non-Reactive Non-Reactive   CBC WITHOUT DIFFERENTIAL    Collection Time: 07/13/21  8:45 AM   Result Value Ref Range    WBC 8.5 4.8 - 10.8 K/uL    RBC 3.85 (L) 4.20 - 5.40 M/uL    Hemoglobin 12.1 12.0 - 16.0 g/dL    Hematocrit 36.6 (L) 37.0 - 47.0 %    MCV 95.1 81.4 - 97.8 fL    MCH 31.4 27.0 - 33.0 pg    MCHC 33.1 (L) 33.6 - 35.0 g/dL    RDW 42.3 35.9 - 50.0 fL    Platelet Count 222 164 - 446 K/uL    MPV 9.8 9.0 - 12.9 fL   SARS-CoV-2 PCR (24 hour In-House): Collect NP swab in [facility]    Collection Time: 09/25/21 10:48 AM    Specimen: Nasopharyngeal; Respirate   Result Value Ref Range    SARS-CoV-2 Source NP Swab     SARS-CoV-2 by PCR NotDetected    CBC WITH DIFFERENTIAL    Collection Time: 09/28/21  7:45 AM   Result Value Ref Range    WBC 9.8 4.8 - 10.8 K/uL    RBC 4.17 (L) 4.20 - 5.40 M/uL    Hemoglobin 13.1 12.0 - 16.0 g/dL    Hematocrit 37.2 37.0 - 47.0 %    MCV 89.2 81.4 - 97.8 fL    MCH 31.4 27.0 - 33.0 pg    MCHC 35.2 (H) 33.6 - 35.0 g/dL    RDW 41.9 35.9 - 50.0 fL    Platelet Count 196 164 - 446 K/uL    MPV 9.6 9.0 - 12.9 fL    Neutrophils-Polys 75.20 (H) 44.00 - 72.00 %    Lymphocytes 15.50 (L) 22.00 - 41.00 %    Monocytes 6.80 0.00 - 13.40 %    Eosinophils 1.20 0.00 - 6.90 %    Basophils 0.30 0.00 - 1.80 %    Immature Granulocytes 1.00 (H) 0.00 - 0.90 %    Nucleated RBC 0.00 /100 WBC    Neutrophils (Absolute) 7.35 (H) 2.00 - 7.15 K/uL    Lymphs (Absolute) 1.51 1.00 - 4.80 K/uL    Monos (Absolute) 0.66 0.00 - 0.85 K/uL    Eos (Absolute) 0.12 0.00 - 0.51 K/uL    Baso (Absolute) 0.03 0.00 - 0.12 K/uL    Immature Granulocytes (abs) 0.10 0.00 - 0.11 K/uL    NRBC (Absolute) 0.00 K/uL   HOLD BLOOD BANK SPECIMEN (NOT TESTED)    Collection Time: 09/28/21  7:45 AM   Result Value Ref Range    Holding Tube - Bb DONE         Assessment:   Tony Foreman at 39w1d  Labor status: Not in labor.  Hx of c/s  Obstetrical history significant for   Patient Active Problem List     Diagnosis Date Noted   • Supervision of other high risk pregnancy, antepartum 2021   • History of , desires repeat no BTL 2021   .      Plan:     Admit to L&D and initiate c/s protocol  GBS negative  Fetal monitoring/toco per protocol    Patient has a history of previous  delivery and is desiring trial of labor after . Discussed with patient today are the risks of trial of labor after  which include uterine rupture risk of one in 1000. Discussed with patient in the event of uterine rupture, immediate emergency  delivery will be performed. There is no guarantee that  can be performed an adequate amount of time to prevent fetal brain damage or death. Also discussed with patient increase risks of hysterectomy and blood transfusion associated with failed vaginal birth after .     I discussed w/ pt risks of surgery repeat c/s including pain, bleeding, infection, risk of damage to intraabdominal structures including bowel/bladder/ureters.  Pt confirms she is accepting of blood transfusion in case of emergency.  She would like to proceed with repeat c/s.  All questions answered.        Sally Giron D.O.

## 2021-09-28 NOTE — CARE PLAN
The patient is Stable - Low risk of patient condition declining or worsening    Shift Goals  Clinical Goals: patient will have acceptable pain level, patient fundus will be firm with lochia within normal limits    Progress made toward(s) clinical / shift goals:  Yes. Patient pain being managed with scheduled Tylenol and Toradol medication, refer to MAR. Patient fundus noted to be firm with light lochia. Mepilex silver dressing in place to incision, dressing clean,dry, and intact.     Patient is not progressing towards the following goals: N/A

## 2021-09-28 NOTE — LACTATION NOTE
This note was copied from a baby's chart.  Mom is a 25 y/o P2 who delivered baby girl via C/S weighing 7# 1.6 oz oz at 39 wks.  Upon entry to room mom had baby latched in cradle hold in crook of arm and baby was tugging the nipple over to the right side. LC placed baby in proper alignment at the breast with ear, shoulder and hip aligned. Mom stated that the latch was more comfortable and baby was deeper on the breast. Encouraged frequent feeds and to call for assist as needed. Follow up with mother in the morning.

## 2021-09-28 NOTE — ANESTHESIA PREPROCEDURE EVALUATION
Relevant Problems   OB   (positive) History of , desires repeat no BTL       Physical Exam    Airway   Mallampati: II  TM distance: >3 FB  Neck ROM: full       Cardiovascular - normal exam  Rhythm: regular  Rate: normal  (-) murmur     Dental - normal exam           Pulmonary - normal exam  Breath sounds clear to auscultation     Abdominal    Neurological - normal exam                 Anesthesia Plan    ASA 2       Plan - spinal   Neuraxial block will be primary anesthetic                Postoperative Plan: Postoperative administration of opioids is intended.    Pertinent diagnostic labs and testing reviewed    Informed Consent:    Anesthetic plan and risks discussed with patient.

## 2021-09-29 PROCEDURE — 700111 HCHG RX REV CODE 636 W/ 250 OVERRIDE (IP): Performed by: ANESTHESIOLOGY

## 2021-09-29 PROCEDURE — 770002 HCHG ROOM/CARE - OB PRIVATE (112)

## 2021-09-29 PROCEDURE — A9270 NON-COVERED ITEM OR SERVICE: HCPCS | Performed by: STUDENT IN AN ORGANIZED HEALTH CARE EDUCATION/TRAINING PROGRAM

## 2021-09-29 PROCEDURE — 700102 HCHG RX REV CODE 250 W/ 637 OVERRIDE(OP): Performed by: ANESTHESIOLOGY

## 2021-09-29 PROCEDURE — A9270 NON-COVERED ITEM OR SERVICE: HCPCS | Performed by: ANESTHESIOLOGY

## 2021-09-29 PROCEDURE — 700102 HCHG RX REV CODE 250 W/ 637 OVERRIDE(OP): Performed by: STUDENT IN AN ORGANIZED HEALTH CARE EDUCATION/TRAINING PROGRAM

## 2021-09-29 RX ORDER — DOCUSATE SODIUM 100 MG/1
100 CAPSULE, LIQUID FILLED ORAL 2 TIMES DAILY PRN
Status: DISCONTINUED | OUTPATIENT
Start: 2021-09-29 | End: 2021-09-30 | Stop reason: HOSPADM

## 2021-09-29 RX ORDER — SODIUM CHLORIDE, SODIUM LACTATE, POTASSIUM CHLORIDE, CALCIUM CHLORIDE 600; 310; 30; 20 MG/100ML; MG/100ML; MG/100ML; MG/100ML
INJECTION, SOLUTION INTRAVENOUS PRN
Status: DISCONTINUED | OUTPATIENT
Start: 2021-09-29 | End: 2021-09-30 | Stop reason: HOSPADM

## 2021-09-29 RX ORDER — ACETAMINOPHEN 500 MG
1000 TABLET ORAL EVERY 4 HOURS PRN
Status: DISCONTINUED | OUTPATIENT
Start: 2021-09-29 | End: 2021-09-30 | Stop reason: HOSPADM

## 2021-09-29 RX ORDER — OXYCODONE HYDROCHLORIDE 5 MG/1
5 TABLET ORAL EVERY 4 HOURS PRN
Status: DISCONTINUED | OUTPATIENT
Start: 2021-09-29 | End: 2021-09-30 | Stop reason: HOSPADM

## 2021-09-29 RX ORDER — BISACODYL 10 MG
10 SUPPOSITORY, RECTAL RECTAL PRN
Status: DISCONTINUED | OUTPATIENT
Start: 2021-09-29 | End: 2021-09-30 | Stop reason: HOSPADM

## 2021-09-29 RX ORDER — IBUPROFEN 800 MG/1
800 TABLET ORAL EVERY 8 HOURS PRN
Status: DISCONTINUED | OUTPATIENT
Start: 2021-09-29 | End: 2021-09-30 | Stop reason: HOSPADM

## 2021-09-29 RX ORDER — OXYCODONE HYDROCHLORIDE 10 MG/1
10 TABLET ORAL EVERY 4 HOURS PRN
Status: DISCONTINUED | OUTPATIENT
Start: 2021-09-29 | End: 2021-09-30 | Stop reason: HOSPADM

## 2021-09-29 RX ADMIN — ACETAMINOPHEN 1000 MG: 500 TABLET ORAL at 00:04

## 2021-09-29 RX ADMIN — KETOROLAC TROMETHAMINE 30 MG: 30 INJECTION, SOLUTION INTRAMUSCULAR; INTRAVENOUS at 06:11

## 2021-09-29 RX ADMIN — ACETAMINOPHEN 1000 MG: 500 TABLET ORAL at 06:10

## 2021-09-29 RX ADMIN — KETOROLAC TROMETHAMINE 30 MG: 30 INJECTION, SOLUTION INTRAMUSCULAR; INTRAVENOUS at 00:04

## 2021-09-29 RX ADMIN — ACETAMINOPHEN 1000 MG: 500 TABLET ORAL at 13:51

## 2021-09-29 RX ADMIN — ACETAMINOPHEN 1000 MG: 500 TABLET ORAL at 20:20

## 2021-09-29 RX ADMIN — IBUPROFEN 800 MG: 800 TABLET, FILM COATED ORAL at 13:51

## 2021-09-29 ASSESSMENT — EDINBURGH POSTNATAL DEPRESSION SCALE (EPDS)
I HAVE BEEN SO UNHAPPY THAT I HAVE BEEN CRYING: ONLY OCCASIONALLY
I HAVE BEEN ANXIOUS OR WORRIED FOR NO GOOD REASON: YES, SOMETIMES
I HAVE FELT SAD OR MISERABLE: NOT VERY OFTEN
I HAVE BEEN SO UNHAPPY THAT I HAVE HAD DIFFICULTY SLEEPING: YES, SOMETIMES
I HAVE BLAMED MYSELF UNNECESSARILY WHEN THINGS WENT WRONG: NOT VERY OFTEN
THINGS HAVE BEEN GETTING ON TOP OF ME: YES, SOMETIMES I HAVEN'T BEEN COPING AS WELL AS USUAL
I HAVE BEEN ABLE TO LAUGH AND SEE THE FUNNY SIDE OF THINGS: NOT QUITE SO MUCH NOW
I HAVE LOOKED FORWARD WITH ENJOYMENT TO THINGS: RATHER LESS THAN I USED TO
I HAVE FELT SCARED OR PANICKY FOR NO GOOD REASON: YES, SOMETIMES
THE THOUGHT OF HARMING MYSELF HAS OCCURRED TO ME: NEVER

## 2021-09-29 ASSESSMENT — PAIN DESCRIPTION - PAIN TYPE
TYPE: ACUTE PAIN
TYPE: SURGICAL PAIN
TYPE: ACUTE PAIN
TYPE: SURGICAL PAIN
TYPE: SURGICAL PAIN
TYPE: ACUTE PAIN
TYPE: ACUTE PAIN
TYPE: SURGICAL PAIN
TYPE: SURGICAL PAIN
TYPE: ACUTE PAIN

## 2021-09-29 NOTE — CONSULTS
Met with MOB for an initial lactation visit.  STEPHANIE delivered her second baby yesterday, 09/28/21, at 1013 at 39.1 weeks gestation. There are no known risk factors for breastfeeding.  MOB reported she successfully breast fed her first baby for 2 years.  MOB reported difficulty with getting a deep latch on the right breast and stated she now has a small blister on her right nipple.    Latch assistance provided at the right breast in the cross cradle position.  Pillows placed underneath infant's body for maximum support and comfort with breastfeeding.  Demonstrated positioning, wedging of the breast, and angle of latch to parents of infant.  MOB was encouraged to hand express colostrum onto infant's lips to illicit a wide mouth response from baby.  Infant latched deep onto MOB's breast, but she appeared to be on the nipple only.  MOB experienced difficulty with keeping infant's head aligned with her breast.  Demonstrated proper placement of MOB's hands on her breast and behind infant's head.  Deep latch achieved and maintained.  MOB reported increased comfort with latch.    Breastfeeding Plan:  Encouraged MOB to offer infant the breast per feeding cues for a minimum of 8 or more feeds in a 24 hour period.    Demonstrated and taught MOB on how to perform hand expression.  MOB was able to perform a successful return demonstration.  MOB was asked to watch the [employer] breastfeeding videos and hand expression tutorial on the Internet for additional reference.    Reviewed feeding cues with parents of infant and the stages of milk production.    MOB provided with a sore nipple/breast care treatment program that included applying colostrum and lanolin cream to her sore and/or damaged breasts as instructed.    MOB verbalized understanding of all information provided to her and denied having any further questions at this time. Encouraged MOB to call for lactation assistance as needed.

## 2021-09-29 NOTE — NON-PROVIDER
Obstetrics & Gynecology Post-Delivery Medical Student Progress Note    Date of Service  2021    26 y.o.  s/p CD on  at 39w6d  Delivery date:  at 10:13am      Subjective  Pt reports no symptoms at this time. No pain or drainage at the incision site. Denies chest pain, SOB, visual changes, headache, leg pain or swelling.  Pain: No  Bleeding: lochia minimal  Tolerating PO: yes  Voiding: without difficulty  Ambulating: yes  Passing flatus: No  Feeding: breastfeeding well      Objective  24hr VS:  Temp:  [35.9 °C (96.7 °F)-36.6 °C (97.8 °F)] 36.3 °C (97.4 °F)  Pulse:  [65-95] 73  Resp:  [16-18] 18  BP: ()/(58-74) 109/74  SpO2:  [95 %-99 %] 99 %    Physical Exam  Gen: well and resting  CV: RRR, nl S1 and S2, no murmur   Resp: unlabored respirations, no intercostal retractions or accessory muscle use, clear to auscultation without rales or wheezes  Abd: no masses, no organomegaly, nontender, non-distended  Fundus: below umbilicus  Incision: dressing clean, dry, intact  Ext: no edema, calves nontender    Labs:      Medications  oxytocin, 2,000 mL/hr, Intravenous, Once  ketorolac, 30 mg, Intravenous, Q6HR      oxyCODONE immediate-release, oxyCODONE immediate release, HYDROmorphone, HYDROmorphone, diphenhydrAMINE **OR** diphenhydrAMINE **OR** naloxone 0.4 mg in 1000 mL infusion, ePHEDrine, ondansetron, diphenhydrAMINE      Assessment/Plan  Tony Foreman is a 26 y.o.yo  postpartum day #1 s/p CD    - Post care: meeting all goals  - Pain: controlled  - Rh+, Rubella Immune  - Method of Feeding: plans to breastfeed  - Method of Contraception: NA  VTE prophylaxis: none indicated    - Disposition: likely home PPD2    Ame Orozco, MS3

## 2021-09-29 NOTE — PROGRESS NOTES
1820: Patient assisted up to bathroom with assistance of two, steady gait noted, perineal care provided, patient assisted back to bed. Patient tolerated ambulation well.

## 2021-09-29 NOTE — CARE PLAN
The patient is Stable - Low risk of patient condition declining or worsening    Shift Goals  Clinical Goals: vss. ambulating and taking care of baby indepen dently. passing flatus    Progress made toward(s) clinical / shift goals:  vss. Passing flatus. Ambulating without difficulty    Patient is not progressing towards the following goals:

## 2021-09-29 NOTE — OP REPORT
DATE OF SERVICE:   2021     PREOPERATIVE DIAGNOSES:  1.  Intrauterine pregnancy at 39w1d  2.  hx of CD     POSTOPERATIVE DIAGNOSES:  1.  Intrauterine pregnancy at 39w1d  2.  same    PROCEDURE PERFORMED:  Repeat low transverse  section.     SURGEON:  Sally Giron DO     ASSISTANT: Gael Fofnaa MD, PGY1     ANESTHESIA:  Spinal.     ANESTHESIOLOGIST:  Ramesh Simpson MD     SPECIMEN:  Placenta     ESTIMATED BLOOD LOSS:  600 mL     FINDINGS:  A live viable , weight 3220g, Apgars of 8 and 8 in vertex presentation with clear amniotic fluid.  Placenta was intact with 3 vessel cord.  There was a normal uterus, tubes, and ovaries bilaterally.     COMPLICATIONS:  None.     INDICATION FOR PROCEDURE: 26-year-old  4 para 1-0-2-1 at 39+1 who presents for scheduled repeat  section.  She was advised all the risks and benefits of a repeat section versus a SRIRAM AC and the patient did not want to have a vaginal birth.  The patient was informed of the risks and benefits of the procedure.  Risks included but were not limited to, bleeding, infection, injury to internal organs, and possible hysterectomy.  The patient expressed understanding the risks involved.  All questions were answered and the patient consented to the procedure.    PROCEDURE:  After appropriate consents were obtained, the patient was taken to the operating room where spinal anesthesia was applied without complications.  The patient was placed in the dorsal supine position with a left tilt of the hips.  The patient was then prepped and draped in the usual sterile manner and prophylactic antibiotics were administered.  Clamp test on the skin verified adequate anesthesia.  A low transverse incision was made with a scalpel and sharp dissection was carried out over subsequent layers of tissue including the fascia using the Bovie electrocautery for hemostasis.  The fascia was incised at the midline and the fascial incision was extended  bilaterally using the curved Garza scissors.  The superior edge of the fascial incision was grasped with the Kocher clamps, tented up, and the underlying rectus muscles were dissected off bluntly and sharply using the curved Garza scissors.  Attention was turned to the inferior edge which was grasped with Kocher clamps tented up and the underlying rectus muscles were dissected bluntly and sharply using the curved Garza scissors.  The rectus muscles were divided at the midline and the peritoneum was identified and entered bluntly.  Care was taken to avoid the bladder as the peritoneal incision was extended superiorly and inferiorly.  The bladder blade was inserted and the vesicouterine peritoneum was identified, elevated, and cut laterally to both sides using the Metzenbaum scissors.  The bladder flap was created using blunt and sharp dissection with the Metzenbaum scissors until the bladder was far inferior from the incision site.  The bladder blade was reinserted and a transverse incision was made in the lower uterine segment using the scalpel.  The uterine incision was extended in a cephalocaudal fashion manually.  The amniotic sac was entered and the fluid was noted to be clear.  The surgeon's hand was placed into the uterine cavity and the fetal head was identified in the cephalic position.  The head was elevated to the uterine incision and delivered with the assistance of fundal pressure.  The  was examined for nuchal cord.  The  was delivered with the assistance of fundal pressure with ease.  On delivery the  was bulb suctioned and the cord was doubly clamped and cut.  The  was passed to the pediatricians for further care. Oxytocin was administered by IV infusion to enhance uterine contraction.  The uterus was exteriorized and cleared of all clots and remaining products of conception.  The hysterotomy incision was reapproximated with 0 Vicryl suture in a running Lembert fashion.   Hemostasis was noted.  The tubes and ovaries were examined and noted to be normal.  The uterus was placed back in the abdomen. The pericolic gutters were examined and any blood clots were removed.  The rectus muscles were examined and hemostatic.  The fascia was reapproximated with 0 Vicryl suture in a running fashion.  The subcutaneous fat was irrigated and any small bleeders were bovied for hemostasis.  The subcutaneous fat was then reapproximated with 2-0 plain gut suture in a running fashion.  The skin was reapproximated with 4-0 Monocryl suture in a running subcuticular stitch. A dressing was placed.  Sponge, needle, instrument, and lap counts were correct x2.  Patient tolerated the procedure well and went to recovery room in stable condition.        ____________________________________     Sally Giron DO

## 2021-09-29 NOTE — PROGRESS NOTES
Obstetrics & Gynecology Post-Delivery Progress Note    Date of Service      26 y.o.  s/p  for repeat  Delivery date: 21 @1013      Subjective  Pt reports Doing well. No concerns  Pain: Yes,  controlled  Bleeding: lochia minimal  Tolerating PO: yes  Voiding: without difficulty  Ambulating: yes  Passing flatus: Yes  Feeding: breastfeeding well      Objective  24hr VS:  Temp:  [35.9 °C (96.7 °F)-36.6 °C (97.8 °F)] 36.2 °C (97.1 °F)  Pulse:  [65-95] 69  Resp:  [16-18] 17  BP: ()/(58-74) 98/70  SpO2:  [95 %-98 %] 96 %    Physical Exam  Gen: well  CV: RRR, nl S1 and S2, no murmur   Resp: unlabored respirations, no intercostal retractions or accessory muscle use  Abd: nontender, normal bowel sounds, soft, non-distended  Fundus: firm and at umbilicus  Incision: dressing clean, dry, intact minimal dry serosanguinous DC from center of incision.    Ext: symmetric and no edema, calves nontender    Labs:  Lab Results   Component Value Date/Time    WBC 13.5 (H) 2021 07:23 PM    RBC 3.56 (L) 2021 07:23 PM    HEMOGLOBIN 11.3 (L) 2021 07:23 PM    HEMATOCRIT 32.5 (L) 2021 07:23 PM    MCV 91.3 2021 07:23 PM    MCH 31.7 2021 07:23 PM    MCHC 34.8 2021 07:23 PM    MPV 9.5 2021 07:23 PM    NEUTSPOLYS 89.30 (H) 2021 07:23 PM    LYMPHOCYTES 6.80 (L) 2021 07:23 PM    MONOCYTES 3.20 2021 07:23 PM    EOSINOPHILS 0.00 2021 07:23 PM    BASOPHILS 0.20 2021 07:23 PM        Medications  oxytocin, 2,000 mL/hr, Intravenous, Once  ketorolac, 30 mg, Intravenous, Q6HR      oxyCODONE immediate-release, oxyCODONE immediate release, HYDROmorphone, HYDROmorphone, diphenhydrAMINE **OR** diphenhydrAMINE **OR** naloxone 0.4 mg in 1000 mL infusion, ePHEDrine, ondansetron, diphenhydrAMINE      Assessment/Plan  Tony Foreman is a 26 y.o.yo  postpartum day #1  s/p  for repeat    - Post care: meeting all goals  - Pain: controlled  - Rh+, Rubella  Immune  - Method of Feeding: plans to breastfeed  - Method of Contraception: NA  VTE prophylaxis: none indicated    - Disposition: likely home PPD 2     Gael Fofana M.D.

## 2021-09-29 NOTE — CARE PLAN
Problem: Pain - Standard  Goal: Alleviation of pain or a reduction in pain to the patient’s comfort goal  Outcome: Progressing     Problem: Altered Physiologic Condition  Goal: Patient physiologically stable as evidenced by normal lochia, palpable uterine involution and vitals within normal limits  Outcome: Progressing     Problem: Infection - Postpartum  Goal: Postpartum patient will be free of signs and symptoms of infection  Outcome: Progressing     Problem: Respiratory/Oxygenation Function Post-Surgical  Goal: Patient will achieve/maintain normal respiratory rate/effort  Outcome: Progressing     The patient is Stable - Low risk of patient condition declining or worsening    Shift Goals  Clinical Goals: Pain control, vitals stable    Progress made toward(s) clinical / shift goals:  Pt reports adequate pain control with scheduled non-narcotic pain medications at this time. Lochia remains light, vitals stable without s/s infection noted. Pt was able to ambulate to restroom within 12hrs post-op, luis catheter discontinued between 12-24hrs post-op, tolerated well. Dura checks in place for 16hrs post-op per protocol, all WNL, no need for supplemental oxygen.

## 2021-09-29 NOTE — DISCHARGE PLANNING
Discharge Planning Assessment Post Partum    Reason for Referral:  Consult-history of THC and MOB scored a 13 on the EPDS screen  Address: [address] [place] [address] [place] [address]  Phone: [phone]  Type of Living Situation: living with FOB and son  Mom Diagnosis: Pregnancy,   Baby Diagnosis: Elrama-39.1 weeks  Primary Language: English    Name of Baby: Yolis Bentley (: 21)  Father of the Baby: Tomás Bentley  Involved in baby’s care? Yes  Contact Information: [phone]    Prenatal Care: Yes  PCP for new baby: Dr. Gibbs    Support System: FOB  Coping/Bonding between mother & baby: Yes  Source of Feeding: breast feeding  Supplies for Infant: prepared for infant; denies any needs    Mom's Insurance: Medicaid  Baby Covered on Insurance:Yes  Mother Employed/School: [employer]  Other children in the home/names & ages: son-age 2 years    Financial Hardship/Income: No   Mom's Mental status: alert and oriented  Services used prior to admit: Medicaid, food stamps, and recently signed up for [facility]    CPS History: No  Psychiatric History: MOB states she had post partum depression after her first baby.  Provided MOB with a list of counseling resources specializing in maternal mental health  Domestic Violence History: No  Drug/ETOH History: history of THC.  Infant's UDS was negative    Resources Provided: post partum support and counseling resources and a children and family resource list provided to mother  Referrals Made: diaper bank referral provided     Clearance for Discharge: Infant is cleared to discharge home with parents

## 2021-09-30 ENCOUNTER — PHARMACY VISIT (OUTPATIENT)
Dept: PHARMACY | Facility: MEDICAL CENTER | Age: 26
End: 2021-09-30
Payer: COMMERCIAL

## 2021-09-30 VITALS
HEIGHT: 65 IN | SYSTOLIC BLOOD PRESSURE: 110 MMHG | TEMPERATURE: 97.6 F | OXYGEN SATURATION: 96 % | WEIGHT: 171 LBS | DIASTOLIC BLOOD PRESSURE: 70 MMHG | HEART RATE: 69 BPM | RESPIRATION RATE: 18 BRPM | BODY MASS INDEX: 28.49 KG/M2

## 2021-09-30 DIAGNOSIS — O09.899 SUPERVISION OF OTHER HIGH RISK PREGNANCY, ANTEPARTUM: ICD-10-CM

## 2021-09-30 PROCEDURE — A9270 NON-COVERED ITEM OR SERVICE: HCPCS | Performed by: STUDENT IN AN ORGANIZED HEALTH CARE EDUCATION/TRAINING PROGRAM

## 2021-09-30 PROCEDURE — 700102 HCHG RX REV CODE 250 W/ 637 OVERRIDE(OP): Performed by: STUDENT IN AN ORGANIZED HEALTH CARE EDUCATION/TRAINING PROGRAM

## 2021-09-30 PROCEDURE — RXMED WILLOW AMBULATORY MEDICATION CHARGE: Performed by: OBSTETRICS & GYNECOLOGY

## 2021-09-30 RX ORDER — ACETAMINOPHEN 500 MG
1000 TABLET ORAL EVERY 4 HOURS PRN
Qty: 30 TABLET | Refills: 0 | Status: SHIPPED | OUTPATIENT
Start: 2021-09-30 | End: 2021-10-26

## 2021-09-30 RX ORDER — IBUPROFEN 800 MG/1
800 TABLET ORAL EVERY 8 HOURS PRN
Qty: 30 TABLET | Refills: 0 | Status: SHIPPED | OUTPATIENT
Start: 2021-09-30 | End: 2021-10-26

## 2021-09-30 RX ORDER — PSEUDOEPHEDRINE HCL 30 MG
100 TABLET ORAL 2 TIMES DAILY PRN
Qty: 30 CAPSULE | Refills: 0 | Status: SHIPPED | OUTPATIENT
Start: 2021-09-30 | End: 2021-10-26

## 2021-09-30 RX ADMIN — ACETAMINOPHEN 1000 MG: 500 TABLET ORAL at 10:24

## 2021-09-30 RX ADMIN — IBUPROFEN 800 MG: 800 TABLET, FILM COATED ORAL at 04:25

## 2021-09-30 NOTE — DISCHARGE INSTRUCTIONS
PATIENT DISCHARGE EDUCATION INSTRUCTION SHEET  REASONS TO CALL YOUR PEDIATRICIAN  · Projectile or forceful vomiting for more than one feeding  · Unusual rash lasting more than 24 hours  · Very sleepy, difficult to wake up  · Bright yellow or pumpkin colored skin with extreme sleepiness  · Temperature below 97.6 or above 100.4 F rectally  · Feeding problems  · Breathing problems  · Excessive crying with no known cause  · If cord starts to become red, swollen, develops a smell or discharge  · No wet diaper or stool in a 24 hour time period     REASONS TO CALL YOUR OBSTETRICIAN  · Persistent fever, shaking, chills (Temperature higher than 100.4) may indicate you have an infection  · Heavy bleeding: soaking more than 1 pad per hour; Passing clots an egg-sized clot or bigger may mean you have an postpartum hemorrhage  · Foul odor from vagina or bad smelling or discolored discharge or blood  · Breast infection (Mastitis symptoms); breast pain, chills, fever, redness or red streaks, may feel flu like symptoms  · Urinary pain, burning or frequency  · Incision that is not healing, increased redness, swelling, tenderness or pain, or any pus from episiotomy or  site may mean you have an infection  · Redness, swelling, warmth, or painful to touch in the calf area of your leg may mean you have a blood clot  · Severe or intensified depression, thoughts or feelings of wanting to hurt yourself or someone else   · Pain in chest, obstructed breathing or shortness of breath (trouble catching your breath) may mean you are having a postpartum complication. Call your provider immediately   · Headache that does not get better, even after taking medicine, a bad headache with vision changes or pain in the upper right area of your belly may mean you have high blood pressure or post birth preeclampsia. Call your provider immediately    SAFE SLEEP POSITIONING FOR YOUR BABY  The American Academy [employer] Pediatric[employer] advises your baby should  be placed on his/her back for Sleeping to reduce the risk of Sudden Infant Death Syndrome (SIDS)  · Baby should sleep by themselves in a crib, portable crib or bassinet  · Baby should not share a bed with his/her parents  · Baby should be placed on his or her back to sleep, night time and at naps  · Baby should sleep on firm mattress with a tightly fitted sheet  · NO couches, waterbeds or anything soft  · Baby's sleep area should not contain any loose blankets, comforters, stuffed animals or any other soft items, (pillows, bumper pads, etc. ...)  · Baby's face should be kept uncovered at all times  · Baby should sleep in a smoke-free environment  · Do not dress baby too warmly to prevent overheating    HAND WASHING  All family and friends should wash their hands:  · Before and after holding the baby  · Before feeding the baby  · After using the restroom or changing the baby's diaper     CARE    TAKING BABY'S TEMPERATURE  · If you feel your baby may have a fever take your baby's temperature per thermometer instructions  · If taking axillary temperature place thermometer under baby's armpit and hold arm close to body  · The most precise and accurate way to take a temperature is rectally  · Turn on the digital thermometer and lubricate the tip of the thermometer with petroleum jelly.  · Lay your baby or child on his or her back, lift his or her thighs, and insert the lubricated thermometer 1/2 to 1 inch (1.3 to 2.5 centimeters) into the rectum  · Call your Pediatrician for temperature lower than 97.6 or greater than 100.4 F rectally    BATHE AND SHAMPOO BABY  · Gently wash baby with a soft cloth using warm water and mild soap - rinse well  · Do not put baby in tub bath until umbilical cord falls off and appears well-healed  · Bathing baby 2-3 times a week might be enough until your baby becomes more mobile. Bathing your baby too much can dry out his or her skin     NAIL CARE  · First recommendation is to keep  them covered to prevent facial scratching  · During the first few weeks,  nails are very soft. Doctors recommend using only a fine emery board. Don't bite or tear your baby's nails. When your baby's nails are stronger, after a few weeks, you can switch to clippers or scissors making sure not to cut too short and nip the quick   · A good time for nail care is while your baby is sleeping and moving less    CORD CARE  · Fold diaper below umbilical cord until cord falls off  · Keep umbilical cord clean and dry  · May see a small amount of crust around the base of the cord. Clean off with mild soap and water and dry             DIAPER AND DRESS BABY  · For baby girls: gently wipe from front to back. Mucous or pink tinged drainage is normal  · For uncircumcised baby boys: do NOT pull back the foreskin to clean the penis. Gently clean with wipes or warm, soapy water  · Dress baby in one more layer of clothing than you are wearing  · Use a hat to protect from sun or cold. NO ties or drawstrings    URINATION AND BOWEL MOVEMENTS  · If formula feeding or when breast milk feeding is established, your baby should wet 6-8 diapers a day and have at least 2 bowel movements a day during the first month  · Bowel movements color and type can vary from day to day    CIRCUMCISION  What to watch out for:  · Foul smelling discharge  · Fever  · Swelling   · Crusty, fluid filled sores  · Trouble urinating   · Persistent bleeding or more than a quarter size spot of blood on his diaper  · Yellow discharge lasting more than a week  · Continue with care procedures until healed or have a visit with your Pediatrician     INFANT FEEDING  · Most newborns feed 8-12 times, every 24 hours. YOU MAY NEED TO WAKE YOUR BABY UP TO FEED  · If breastfeeding, offer both breasts when your baby is showing feeding cues, such as rooting or bringing hand to mouth and sucking  · Common for  babies to feed every 1-3 hours   · Only allow baby to sleep  up to 4 hours in between feeds if baby is feeding well at each feed. Offer breast anytime baby is showing feeding cues and at least every 3 hours  · Follow up with outpatient [facility] for continued breast feeding support    FORMULA FEEDING  · Feed baby formula every 2-3 hours when your baby is showing feeding cues  · Paced bottle feeding will help baby not over eat at each feed     BOTTLE FEEDING   · Paced Bottle Feeding is a method of bottle feeding that allows the infant to be more in control of the feeding pace. This feeding method slows down the flow of milk into the nipple and the mouth, allowing the baby to eat more slowly, and take breaks. Paced feeding reduces the risk of overfeeding that may result in discomfort for the baby   · Hold baby almost upright or slightly reclined position supporting the head and neck  · Use a small nipple for slow-flowing. Slow flow nipple holes help in controlling flow   · Don't force the bottle's nipple into your baby's mouth. Tickle babies lip so baby opens their mouth  · Insert nipple and hold the bottle flat  · Let the baby suck three to four times without milk then tip the bottle just enough to fill the nipple about [facility] with milk  · Let baby suck 3-5 continuous swallows, about 20-30 seconds tip the bottle down to give the baby a break  · After a few seconds, when the baby begins to suck again, tip bottle up to allow milk to flow into the nipple  · Continue to Pace feed until baby shows signs of fullness; no longer sucking after a break, turning away or pushing away the nipple   · Bottle propping is not a recommended practice for feeding  · Bottle propping is when you give a baby a bottle by leaning the bottle against a pillow, or other support, rather than holding the baby and the bottle.  · Forces your baby to keep up with the flow, even if the baby is full   · This can increase your baby's risk of choking, ear infections, and tooth decay    BOTTLE  "PREPARATION   · Never feed  formula to your baby, or use formula if the container is dented  · When using ready-to-feed, shake formula containers before opening  · If formula is in a can, clean the lid of any dust, and be sure the can opener is clean  · Formula does not need to be warmed. If you choose to feed warmed formula, do not microwave it. This can cause \"hot spots\" that could burn your baby. Instead, set the filled bottle in a bowl of warm (not boiling) water or hold the bottle under warm tap water. Sprinkle a few drops of formula on the inside of your wrist to make sure it's not too hot  · Measure and pour desired amount of water into baby bottle  · Add unpacked, level scoop(s) of powder to the bottle as directed on formula container. Return dry scoop to can  · Put the cap on the bottle and shake. Move your wrist in a twisting motion helps powder formula mix more quickly and more thoroughly  · Feed or store immediately in refrigerator  · You need to sterilize bottles, nipples, rings, etc., only before the first use    CLEANING BOTTLE  · Use hot, soapy water  · Rinse the bottles and attachments separately and clean with a bottle brush  · If your bottles are labelled  safe, you can alternatively go ahead and wash them in the    · After washing, rinse the bottle parts thoroughly in hot running water to remove any bubbles or soap residue   · Place the parts on a bottle drying rack   · Make sure the bottles are left to drain in a well-ventilated location to ensure that they dry thoroughly  CAR SEAT  For your baby's safety and to comply with [facility] Law you will need to bring a car seat to the hospital before taking your baby home. Please read your car seat instructions before your baby's discharge from the hospital.  · Make sure you place an emergency contact sticker on your baby's car seat with your baby's identifying information  · Car seat should not be placed in the front seat " of a vehicle. The car seat should be placed in the back seat in the rear-facing position.  · Car seat information is available through [employer] Seat [employer] at 105-3657 and also at [email or web address]    MATERNAL CARE     WOUND CARE  Ask your physician for additional care instructions. In general:  ·  Incision:  · May shower and pat incision dry   · Keep the incision clean and dry  · There should not be any opening or pus from the incision  · Continue to walk at home 3 times a day   · Do NOT lift anything heavier than your baby (over 10 pounds)  · Encourage family to help participate in care of the  to allow rest and mom time to heal    · Episiotomy/Laceration  · May use noam-spray bottle, witch hazel pads and dermaplast spray for comfort  · Use noam-spray bottle after urinating to cleanse perineal area  · To prevent burning during urination spray noam-water bottle on labial area   · Pat perineal area dry until episiotomy/laceration is healed  · Continue to use noam-bottle until bleeding stops as needed  · If have a 2nd degree laceration or greater, a Sitz bath can offer relief from soreness, burning, and inflammation   · Sitz Bath   · Sit in 6 inches of warm water and soak laceration as needed until the laceration heals    VAGINAL CARE AND BLEEDING  · Nothing inside vagina for 6 weeks:   · No sexual intercourse, tampons or douching  · Bleeding may continue for 2-4 weeks. Amount and color may vary  · Soaking 1 pad or more in an hour for several hours is considered heavy bleeding  · Passing large egg sized blood clots can be concerning  · If you feel like you have heavy bleeding or are having increasing amount of blood clots call your Obstetrician immediately  · If you begin feeling faint upon standing, feeling sick to your stomach, have clammy skin, a really fast heartbeat, have chills, start feeling confused, dizzy, sleepy or weak, or feeling like you're going to faint call your Obstetrician  "immediately    HYPERTENSION   Preeclampsia or gestational hypertension are types of high blood pressure that only pregnant women can get. It is important for you to be aware of symptoms to seek early intervention and treatment. If you have any of these symptoms immediately call your Obstetrician    · Vision changes or blurred vision   · Severe headache or pain that is unrelieved with medication and will not go away  · Persistent pain in upper abdomen or shoulder   · Increased swelling of face, feet, or hands  · Difficulty breathing or shortness of breath at rest  · Urinating less than usual    URINATION AND BOWEL MOVEMENTS  · Eating more fiber (bran cereal, fruits, and vegetables) and drinking plenty of fluids will help to avoid constipation  · Urinary frequency and urgency after childbirth is normal  · If you experience any urinary pain, burning or frequency call your provider    BIRTH CONTROL  · It is possible to become pregnant at any time after delivery and while breastfeeding  · Plan to discuss a method of birth control with your physician at your post-delivery follow up visit    POSTPARTUM BLUES  During the first few days after birth, you may experience a sense of the \"blues\" which may include impatience, irritability or even crying. These feelings come and go quickly. However, as many as 1 in 10 women experience emotional symptoms known as postpartum depression.     POSTPARTUM DEPRESSION    May start as early as the second or third day after delivery or take several weeks or months to develop. Symptoms of \"blues\" are present, but are more intense: Crying spells; loss of appetite; feelings of hopelessness or loss of control; fear of touching the baby; over concern or no concern at all about the baby; little or no concern about your own appearance/caring for yourself; and/or inability to sleep or excessive sleeping. Contact your Obstetrician if you are experiencing any of these symptoms     PREVENTING SHAKEN " "BABY  If you are angry or stressed, PUT THE BABY IN THE CRIB, step away, take some deep breaths, and wait until you are calm to care for the baby. DO NOT SHAKE THE BABY. You are not alone, call a supporter for help.  · Crisis [facility] Center 24/7 crisis call line (934-559-4536) or (1-403.504.7914)  · You can also text them, text \"ANSWER\" (285880)      "

## 2021-09-30 NOTE — PROGRESS NOTES
1900- report received from day RN. POC discussed including feeding intervals, I+O documentation, latch support, lochia changes, ambulation, infant temperature management including STS and layering, weights, VS intervals, and discharge planning. Medications and other comfort measures discussed. Call light in reach.

## 2021-09-30 NOTE — DISCHARGE SUMMARY
Discharge Summary:     Date of Admission: 2021  Date of Discharge: 21      Admitting diagnosis:    1. Pregnancy @ 39w1d        Discharge Diagnosis:   1. Status post  for repeat.    History reviewed. No pertinent past medical history.  OB History    Para Term  AB Living   4 2 2   2 2   SAB TAB Ectopic Molar Multiple Live Births   2       0 2      # Outcome Date GA Lbr Gurvinder/2nd Weight Sex Delivery Anes PTL Lv   4 Term 21 39w1d  3.22 kg (7 lb 1.6 oz) F CS-LTranv Spinal N ANNA   3 SAB 10/27/20 6w0d             Birth Comments: Pt states passed on its own.    2 Term 18 38w3d  2.535 kg (5 lb 9.4 oz) M CS-LTranv Spinal N ANNA      Complications: Fetal Intolerance   1 SAB 2018        FD      Birth Comments: Pt states passed on its own      Past Surgical History:   Procedure Laterality Date   • REPEAT C SECTION Bilateral 2021    Procedure:  SECTION, REPEAT;  Surgeon: Sally Giron D.O.;  Location: [facility] DEL[facility];  Service: Obstetrics   • PRIMARY C SECTION N/A 2018    Procedure: PRIMARY C SECTION;  Surgeon: Rina Gaston M.D.;  Location: LABOR AND DELIVERY;  Service: Obstetrics     Patient has no known allergies.    Patient Active Problem List   Diagnosis   • History of , desires repeat no BTL   • Supervision of other high risk pregnancy, antepartum       Hospital Course:   Pt is a 26 y.o. now  who presented for scheduled  at 39+1. Previous  was due to FHT issues patient did not want to attempt TOLAC. Patient and baby tolerated procedure well. Postoperative course has been unremarkable patient now postoperative day 2 and doing well. Pain is been well controlled and vital signs have been stable. Patient is up and ambulating without issue denies any nausea or vomiting, voiding and stooling well.      Physical Exam:  Temp:  [36.4 °C (97.5 °F)-36.8 °C (98.2 °F)] 36.4 °C (97.6 °F)  Pulse:  [69-75] 69  Resp:   [17-18] 18  BP: (110-120)/(70-76) 110/70  SpO2:  [96 %-98 %] 96 %  Physical Exam  General: well  Chest/Breasts: breasts soft   Abdomen: nontender, normal bowel sounds, soft, non-distended  Fundus: firm and at umbilicus  Incision: dressing clean, dry, intact and healing well  Perineum: deferred   Extremities: symmetric and no edema, calves nontender    Current Facility-Administered Medications   Medication Dose   • LR infusion     • docusate sodium (COLACE) capsule 100 mg  100 mg   • bisacodyl (DULCOLAX) suppository 10 mg  10 mg   • ibuprofen (MOTRIN) tablet 800 mg  800 mg   • acetaminophen (TYLENOL) tablet 1,000 mg  1,000 mg   • oxyCODONE immediate-release (ROXICODONE) tablet 5 mg  5 mg   • oxyCODONE immediate release (ROXICODONE) tablet 10 mg  10 mg       Recent Labs     21  0745 21  1923   WBC 9.8 13.5*   RBC 4.17* 3.56*   HEMOGLOBIN 13.1 11.3*   HEMATOCRIT 37.2 32.5*   MCV 89.2 91.3   MCH 31.4 31.7   MCHC 35.2* 34.8   RDW 41.9 43.0   PLATELETCT 196 174   MPV 9.6 9.5         Activity/ Discharge Instructions::   Discharge to home  Pelvic Rest x 6 weeks  No heavy lifting x4 weeks  Call or come to ED for: heavy vaginal bleeding, fever >100.4, severe abdominal pain, severe headache, chest pain, shortness of breath,  N/V, incisional drainage, or other concerns.       Follow up:  1 week for incision check for  delivery.     Discharge Meds:   Current Outpatient Medications   Medication Sig Dispense Refill   • acetaminophen (TYLENOL) 500 MG Tab Take 2 Tablets by mouth every four hours as needed. 30 Tablet 0   • docusate sodium 100 MG Cap Take 100 mg by mouth 2 times a day as needed for Constipation. 30 Capsule 0   • ibuprofen (MOTRIN) 800 MG Tab Take 1 Tablet by mouth every 8 hours as needed (For cramping after delivery; do not give if patient is receiving ketorolac (Toradol)). 30 Tablet 0       Gael Fofana M.D.

## 2021-09-30 NOTE — LACTATION NOTE
Met with MOB for a lactation follow up visit.  MOB reported she continues to work on latch at the right breast.  She stated she has received a good amount of help from lactation and her primary care RNs and denied needing any lactation assistance at this time.  MOB stated she prefers to work on latch at her right breast independently.  Latch assistance was offered, but MOB declined.      The right nipple has a scabbed abrasion at the center of it which appears to be resolving.    Sore nipple/breast care treatment plan remains in place.    MOB informed that if she is not able to latch infant onto her right breast, then she will need to pump as instructed to protect and grow milk supply at this breast.  MOB verbalized understanding and stated she has a breast pump for home use.  MOB also informed that she should feed back to infant the expressed breast milk she receives from pumping at this breast immediately afterwards.    MOB provided with a copy of the [facility]'s milk storage guidelines.    STEPHANIE has WIC and is seen at the office on [facility] in [place], [place].  MOB was encouraged to follow up with WIC immediately post discharge should she continue to have difficulty latching onto her right breast to feed.    Breastfeeding Plan:  Continue to offer infant the breast per feeding cues for a minimum of 8 or more breastfeeds in a 24 hour period.    Pump and feed back to infant MOB's expressed breast milk via bottle if infant is unable to latch onto the right breast to feed.    MOB verbalized understanding of all information provided to her and denied having any lactation questions at this time.  Encouraged MOB to call for lactation assistance as needed prior to discharge.

## 2021-09-30 NOTE — PROGRESS NOTES
Discharged home with baby . Ambulated to car without difficulty. Instructions given on infant care and safety, self care, and reasons to call the

## 2021-09-30 NOTE — CARE PLAN
The patient is Stable - Low risk of patient condition declining or worsening    Shift Goals  Clinical Goals: bleeding WDL  Patient Goals: pain WDL  Family Goals: bond    Progress made toward(s) clinical / shift goals:    Problem: Knowledge Deficit - Standard  Goal: Patient and family/care givers will demonstrate understanding of plan of care, disease process/condition, diagnostic tests and medications  Outcome: Progressing     Problem: Pain - Standard  Goal: Alleviation of pain or a reduction in pain to the patient’s comfort goal  Outcome: Progressing     Problem: Knowledge Deficit - Postpartum  Goal: Patient will verbalize and demonstrate understanding of self and infant care  Outcome: Progressing     Problem: Psychosocial - Postpartum  Goal: Patient will verbalize and demonstrate effective bonding and parenting behavior  Outcome: Progressing     Problem: Altered Physiologic Condition  Goal: Patient physiologically stable as evidenced by normal lochia, palpable uterine involution and vitals within normal limits  Outcome: Progressing     Problem: Infection - Postpartum  Goal: Postpartum patient will be free of signs and symptoms of infection  Outcome: Progressing     Problem: Respiratory/Oxygenation Function Post-Surgical  Goal: Patient will achieve/maintain normal respiratory rate/effort  Outcome: Progressing     Problem: Bowel Elimination - Post Surgical  Goal: Patient will resume regular bowel sounds and function with no discomfort or distention  Outcome: Progressing     Problem: Early Mobilization - Post Surgery  Goal: Early mobilization post surgery  Outcome: Progressing       Patient is not progressing towards the following goals:

## 2021-09-30 NOTE — DISCHARGE PLANNING
Meds-to-Beds: Discharge prescription orders listed below delivered to patient's bedside. RN notified. Patient counseled. Patient educated not to exceed more than 8 tablets of acetaminophen per day.      Current Outpatient Medications   Medication Sig Dispense Refill   • acetaminophen (TYLENOL) 500 MG Tab Take 2 Tablets by mouth every four hours as needed. 30 Tablet 0   • docusate sodium 100 MG Cap Take 1 capsule by mouth 2 times a day as needed for Constipation. 30 Capsule 0   • ibuprofen (MOTRIN) 800 MG Tab Take 1 Tablet by mouth every 8 hours as needed For cramping after delivery. 30 Tablet 0      Betty Khan, PharmD

## 2021-10-07 ENCOUNTER — POST PARTUM (OUTPATIENT)
Dept: OBGYN | Facility: CLINIC | Age: 26
End: 2021-10-07
Payer: MEDICAID

## 2021-10-07 ENCOUNTER — TELEPHONE (OUTPATIENT)
Dept: OBGYN | Facility: CLINIC | Age: 26
End: 2021-10-07

## 2021-10-07 VITALS — WEIGHT: 158 LBS | SYSTOLIC BLOOD PRESSURE: 100 MMHG | DIASTOLIC BLOOD PRESSURE: 64 MMHG | BODY MASS INDEX: 26.29 KG/M2

## 2021-10-07 DIAGNOSIS — R50.82 POSTOPERATIVE FEVER: ICD-10-CM

## 2021-10-07 LAB
APPEARANCE UR: NORMAL
BILIRUB UR STRIP-MCNC: NORMAL MG/DL
COLOR UR AUTO: NORMAL
GLUCOSE UR STRIP.AUTO-MCNC: NEGATIVE MG/DL
KETONES UR STRIP.AUTO-MCNC: NEGATIVE MG/DL
LEUKOCYTE ESTERASE UR QL STRIP.AUTO: NORMAL
NITRITE UR QL STRIP.AUTO: NEGATIVE
PH UR STRIP.AUTO: 6 [PH] (ref 5–8)
PROT UR QL STRIP: NEGATIVE MG/DL
RBC UR QL AUTO: NORMAL
SP GR UR STRIP.AUTO: 1.01
UROBILINOGEN UR STRIP-MCNC: NORMAL MG/DL

## 2021-10-07 PROCEDURE — 90050 PR POSTPARTUM VISIT: CPT | Performed by: OBSTETRICS & GYNECOLOGY

## 2021-10-07 PROCEDURE — 81002 URINALYSIS NONAUTO W/O SCOPE: CPT | Performed by: OBSTETRICS & GYNECOLOGY

## 2021-10-07 NOTE — TELEPHONE ENCOUNTER
===========[id number]  Thu 07-Oct-21 07:38a  ======================================  AW   CLINIC: [facility]  CALL TYPE: Est OB Pt  TO:  NM: Tony Foreman   PH: [phone]   PT NM: Tony Foreamn   : 1995   REG DR: Dr Underwood   RE: Patient had   and  she has a fever and says her incision  is infected     DISP HIST: 10/07/2021 07:34A JHONNY  refered to nurse hotline    --------------------------------------  Message History  Account: [id number]  Taken:  Thu 07-Oct-2021  7:38a JHONNY  Serial#: [id number]  [id number]      10/7/2021 0840 Left message for pt to call back regarding message above.   0914 pt calling back states she her incision was very painful on Tuesday, sharp pain out of nowhere but at night got better but yesterday she work up feeling weak and with a fever it was 101 F and  This morning her fever was 103.3F. pt states she is not able to see redness on the incision, does not feel warm to the tough and no d/c noted. Consulted with Dr. Giron and advised for pt to be schedule for today at 1300. Pt scheduled. Pt agreed and verbalized understanding.

## 2021-10-07 NOTE — PROGRESS NOTES
GYN Visit:    CC: postop check    HPI: 26 y.o.yo  s/p repeat CD on 21 with me for repeat    Had a fever yesterday and today, tmax was 103.0. Denies incisional drainage. Chills associated with fever, but none recently.  Fever responded to tylenol  Minimal pain.  Denies vaginal bleeding or discharge.  No problems with urination or bowel movement. Breast feeding going okay.     ROS:   Gen: denies fevers, general concerns  Abd: denies abd pain, N/V, constipation  : denies vaginal bleeding, discharge    /64   Wt 71.7 kg (158 lb)   LMP  (LMP Unknown)   BMI 26.29 kg/m²   Gen: AAO, NAD  Abd: soft, NT, ND, incisions healing well, fundus is below umbilicus and appropriately tender, no concerns, no suprapubic tenderness  Breasts: very engorged bilateral breasts, no warmth or redness noted, nipples appear normal    : deferred    A/P:26 y.o.yo  s/p repeat CD on 21 with me for repeat  - postoperative fever of unknown origin  - she does have quite engorged breasts, she feels her phalanges for breast pump are too small and her baby isn't emptying her milk completely.  No signs of mastitis.   - UA in office normal except mod leuks.  Told to monitor for s/sx of UTI, no plans to treat today  - continue to monitor for other signs of infection    F/u: for PP visit or earlier if fever continues     Sally Giron D.O.  [facility], Women's [facility]

## 2021-10-26 ENCOUNTER — OFFICE VISIT (OUTPATIENT)
Dept: URGENT CARE | Facility: CLINIC | Age: 26
End: 2021-10-26
Payer: MEDICAID

## 2021-10-26 VITALS
TEMPERATURE: 98.1 F | DIASTOLIC BLOOD PRESSURE: 60 MMHG | SYSTOLIC BLOOD PRESSURE: 100 MMHG | HEART RATE: 75 BPM | HEIGHT: 65 IN | RESPIRATION RATE: 16 BRPM | WEIGHT: 156.8 LBS | BODY MASS INDEX: 26.12 KG/M2 | OXYGEN SATURATION: 98 %

## 2021-10-26 DIAGNOSIS — J02.0 STREP PHARYNGITIS: ICD-10-CM

## 2021-10-26 DIAGNOSIS — J02.9 SORE THROAT: ICD-10-CM

## 2021-10-26 LAB
INT CON NEG: NORMAL
INT CON POS: NORMAL
S PYO AG THROAT QL: POSITIVE

## 2021-10-26 PROCEDURE — 99214 OFFICE O/P EST MOD 30 MIN: CPT | Performed by: FAMILY MEDICINE

## 2021-10-26 PROCEDURE — 87880 STREP A ASSAY W/OPTIC: CPT | Performed by: FAMILY MEDICINE

## 2021-10-26 RX ORDER — PENICILLIN V POTASSIUM 500 MG/1
500 TABLET ORAL 2 TIMES DAILY
Qty: 20 TABLET | Refills: 0 | Status: SHIPPED | OUTPATIENT
Start: 2021-10-26 | End: 2021-11-05

## 2021-10-27 NOTE — PROGRESS NOTES
"Sandra Foreman is a 26 y.o. female who presents with Pharyngitis (ear pain x 1 day)  No URI symptoms.  No strep exposure.  Review of system otherwise negative.          HPI    ROS           Objective     /60   Pulse 75   Temp 36.7 °C (98.1 °F) (Temporal)   Resp 16   Ht 1.651 m (5' 5\")   Wt 71.1 kg (156 lb 12.8 oz)   LMP  (LMP Unknown)   SpO2 98%   BMI 26.09 kg/m²      Physical Exam  Constitutional:       General: She is not in acute distress.     Appearance: She is not ill-appearing, toxic-appearing or diaphoretic.   HENT:      Head: Atraumatic.      Right Ear: External ear normal.      Left Ear: External ear normal.      Nose: No rhinorrhea.      Mouth/Throat:      Mouth: Mucous membranes are moist.      Pharynx: Oropharynx is clear. Posterior oropharyngeal erythema present. No oropharyngeal exudate.   Eyes:      Conjunctiva/sclera: Conjunctivae normal.   Cardiovascular:      Rate and Rhythm: Normal rate and regular rhythm.      Heart sounds: No murmur heard.   No gallop.    Pulmonary:      Effort: Pulmonary effort is normal. No respiratory distress.      Breath sounds: No stridor. No wheezing, rhonchi or rales.   Musculoskeletal:      Cervical back: Neck supple.   Lymphadenopathy:      Cervical: No cervical adenopathy.   Skin:     Coloration: Skin is not jaundiced or pale.   Neurological:      Mental Status: She is alert and oriented to person, place, and time.   Psychiatric:         Behavior: Behavior normal.                  Results for orders placed or performed in visit on 10/26/21   POCT Rapid Strep A   Result Value Ref Range    Rapid Strep Screen positive     Internal Control Positive Valid     Internal Control Negative Valid               Assessment & Plan        1. Strep pharyngitis  - penicillin v potassium (VEETID) 500 MG Tab; Take 1 Tablet by mouth 2 times a day for 10 days.  Dispense: 20 Tablet; Refill: 0    2. Sore throat  - POCT Rapid Strep A         Continue symptomatic " care  Warning signs reviewed  Follow up if not significantly improved as expected, sooner if any worsening or new symptoms

## 2021-11-02 ENCOUNTER — POST PARTUM (OUTPATIENT)
Dept: OBGYN | Facility: CLINIC | Age: 26
End: 2021-11-02
Payer: MEDICAID

## 2021-11-02 VITALS — WEIGHT: 156 LBS | DIASTOLIC BLOOD PRESSURE: 62 MMHG | BODY MASS INDEX: 25.96 KG/M2 | SYSTOLIC BLOOD PRESSURE: 102 MMHG

## 2021-11-02 DIAGNOSIS — Z30.09 UNWANTED FERTILITY: ICD-10-CM

## 2021-11-02 PROCEDURE — 0503F POSTPARTUM CARE VISIT: CPT | Performed by: NURSE PRACTITIONER

## 2021-11-02 ASSESSMENT — ENCOUNTER SYMPTOMS
CONSTITUTIONAL NEGATIVE: 1
EYES NEGATIVE: 1
RESPIRATORY NEGATIVE: 1
PSYCHIATRIC NEGATIVE: 1
CARDIOVASCULAR NEGATIVE: 1
MUSCULOSKELETAL NEGATIVE: 1
NEUROLOGICAL NEGATIVE: 1
GASTROINTESTINAL NEGATIVE: 1

## 2021-11-02 ASSESSMENT — EDINBURGH POSTNATAL DEPRESSION SCALE (EPDS)
I HAVE BEEN SO UNHAPPY THAT I HAVE HAD DIFFICULTY SLEEPING: NOT AT ALL
I HAVE BEEN SO UNHAPPY THAT I HAVE BEEN CRYING: NO, NEVER
THINGS HAVE BEEN GETTING ON TOP OF ME: NO, MOST OF THE TIME I HAVE COPED QUITE WELL
I HAVE BEEN ANXIOUS OR WORRIED FOR NO GOOD REASON: YES, SOMETIMES
I HAVE BEEN ABLE TO LAUGH AND SEE THE FUNNY SIDE OF THINGS: AS MUCH AS I ALWAYS COULD
I HAVE BLAMED MYSELF UNNECESSARILY WHEN THINGS WENT WRONG: NO, NEVER
I HAVE FELT SCARED OR PANICKY FOR NO GOOD REASON: NO, NOT AT ALL
THE THOUGHT OF HARMING MYSELF HAS OCCURRED TO ME: NEVER
TOTAL SCORE: 3
I HAVE FELT SAD OR MISERABLE: NO, NOT AT ALL
I HAVE LOOKED FORWARD WITH ENJOYMENT TO THINGS: AS MUCH AS I EVER DID

## 2021-11-02 NOTE — PROGRESS NOTES
Pt here today for postpartum exam.  Delivery type: C section 9/28/21  Currently : breast feeding  Desired BCM:  Maybe nexplanon  LMP: n/a  Last pap:5/7/21=negative  Phone # [phone]

## 2021-11-02 NOTE — PROGRESS NOTES
Subjective:    Tony Foreman is a 26 y.o. female who presents for her postpartum exam 5 weeks following repeat  on 21. Her prenatal course was uncomplicated. She denies dysuria, vaginal bleeding, odor, itching or breast problems. She is breastfeeding. She is seeing LC at [facility] because she was having some difficulty with one of her breasts not emptying but now resolved.  She desires an nexplanon for her birth control method. Reports no sex prior to this appointment. Eating a regular diet without difficulty. Bowel movement are Normal.  The patient is not having any pain. Spotting. Patient Denies Incisional pain, drainage or redness. Patient denies anxiety or  postpartum depression.   Review of Systems   Constitutional: Negative.    HENT: Negative.    Eyes: Negative.    Respiratory: Negative.    Cardiovascular: Negative.    Gastrointestinal: Negative.    Genitourinary: Negative.    Musculoskeletal: Negative.    Skin: Negative.    Neurological: Negative.    Endo/Heme/Allergies: Negative.    Psychiatric/Behavioral: Negative.    All other systems reviewed and are negative.      Problem List     Patient Active Problem List    Diagnosis Date Noted   • Supervision of other high risk pregnancy, antepartum 2021   • History of , desires repeat no BTL 2021       Objective  Physical Exam  Vitals and nursing note reviewed.   Constitutional:       Appearance: Normal appearance. She is normal weight.   HENT:      Head: Normocephalic and atraumatic.      Right Ear: External ear normal.      Left Ear: External ear normal.      Nose: Nose normal.   Eyes:      Extraocular Movements: Extraocular movements intact.      Conjunctiva/sclera: Conjunctivae normal.      Pupils: Pupils are equal, round, and reactive to light.   Cardiovascular:      Rate and Rhythm: Normal rate and regular rhythm.      Pulses: Normal pulses.      Heart sounds: Normal heart sounds.   Pulmonary:      Effort: Pulmonary effort is  normal.      Breath sounds: Normal breath sounds.   Abdominal:      General: Abdomen is flat. There is no distension.      Palpations: Abdomen is soft.      Tenderness: There is no abdominal tenderness.   Musculoskeletal:         General: Normal range of motion.      Cervical back: Normal range of motion and neck supple.   Skin:     General: Skin is warm and dry.      Capillary Refill: Capillary refill takes less than 2 seconds.      Comments: Incision to lower abdomen healed.    Neurological:      General: No focal deficit present.      Mental Status: She is alert and oriented to person, place, and time. Mental status is at baseline.   Psychiatric:         Mood and Affect: Mood normal.         Behavior: Behavior normal.         Thought Content: Thought content normal.         Judgment: Judgment normal.         See PE  Lab: H&H at d/c:   /62   Wt 70.8 kg (156 lb)   LMP  (LMP Unknown)   BMI 25.96 kg/m²     Assessment:    1. PP care of lactating women   2. Exam WNL   3. Pap WNL on 2021  4. Desires contraception       Plan:    1. Breastfeeding support   2. Continue PNV   3. Contraceptive counseling - desires nexplanon.  Information given on placement, side effects, etc and referral placed for HPN  4. Encouraged condom use until nexplanon  5. Discussed diet, exercise and resumption of sexual activity   6. Preconception guidance for next pregnancy if applicable. Hx of  x 2 risk factors. Folic acid for all women of childbearing age.   7. Annual well woman exams

## 2021-11-16 DIAGNOSIS — Z30.017 ENCOUNTER FOR INITIAL PRESCRIPTION OF IMPLANTABLE SUBDERMAL CONTRACEPTIVE: ICD-10-CM

## 2021-11-16 PROBLEM — Z30.013 ENCOUNTER FOR INITIAL PRESCRIPTION OF INJECTABLE CONTRACEPTIVE: Status: ACTIVE | Noted: 2021-11-16

## 2021-12-01 ENCOUNTER — GYNECOLOGY VISIT (OUTPATIENT)
Dept: OBGYN | Facility: CLINIC | Age: 26
End: 2021-12-01
Payer: MEDICAID

## 2021-12-01 VITALS
HEIGHT: 65 IN | WEIGHT: 158 LBS | BODY MASS INDEX: 26.33 KG/M2 | SYSTOLIC BLOOD PRESSURE: 108 MMHG | DIASTOLIC BLOOD PRESSURE: 70 MMHG

## 2021-12-01 DIAGNOSIS — Z30.017 NEXPLANON INSERTION: Primary | ICD-10-CM

## 2021-12-01 PROBLEM — Z98.891 HISTORY OF C-SECTION: Status: RESOLVED | Noted: 2021-05-07 | Resolved: 2021-12-01

## 2021-12-01 PROBLEM — O09.899 SUPERVISION OF OTHER HIGH RISK PREGNANCY, ANTEPARTUM: Status: RESOLVED | Noted: 2021-09-02 | Resolved: 2021-12-01

## 2021-12-01 LAB
INT CON NEG: NEGATIVE
INT CON POS: POSITIVE
POC URINE PREGNANCY TEST: NEGATIVE

## 2021-12-01 PROCEDURE — 11981 INSERTION DRUG DLVR IMPLANT: CPT | Performed by: NURSE PRACTITIONER

## 2021-12-01 PROCEDURE — 81025 URINE PREGNANCY TEST: CPT | Performed by: NURSE PRACTITIONER

## 2021-12-01 NOTE — NON-PROVIDER
Pt here today for Insertion of Nexplanon  UPT Result:negative  LMP: n/a. Breast feeding  Current BCM: none  Phone #: [phone]  Pharmacy verified and confirmed

## 2021-12-01 NOTE — PROCEDURES
Nexplanon Placement    Date/Time: 12/1/2021 2:43 PM  Performed by: MARCIA Riley  Authorized by: MARCIA Riley   Preparation: Patient was prepped and draped in the usual sterile fashion.  Local anesthesia used: yes    Anesthesia:  Local anesthesia used: yes  Local Anesthetic: lidocaine 1% without epinephrine    Sedation:  Patient sedated: no    Patient tolerance: patient tolerated the procedure well with no immediate complications        Procedure note; Nexplanon insertion;    Informed consent obtained, consent signed-discussed the risks of Nexplanon; pain, bleeding, infection, bruising, possible pregnancy, difficulty with removing implant, possible scarring to arm.   All the risks and benefits of the procedure and the device are explained and patient verbalizes understanding and signs the consent     Urine hCG negative    Patient positioned supine with nondominant arm exposed.    Medial epicondyle of RT arm palpated    Surgical benja placed 8-10 cm medial to the medial epicondyle    Betadine prep the skin    Local anesthesia-1% lidocaine injected using a 1 1/2 inch 27 gauge needle     Implant inserted via the Nexplanon insertion device subdermally in a sterile fashion    The patient and provider can feel the implant under the skin and the blue end of the insertion device is present indicating implant insertion    Steristrip and sterile 4x4's     Pressure bandage placed    Patient instructed to remove dressing  in 24 hours    Patient instructed to use a band-aid for 2 days there after    Patient tolerated the procedure well    Lot #[id number]   Exp: [id number]    Return to Clinic for:  Pain, redness or bleeding at the insertion site   Any purulent drainage (puss)  Heavy bleeding (saturating a pad every 1-2 hours) that lasts more than 1-2 days   Follow up in 4 weeks for post insertion check

## 2022-09-14 ENCOUNTER — OFFICE VISIT (OUTPATIENT)
Dept: URGENT CARE | Facility: CLINIC | Age: 27
End: 2022-09-14
Payer: COMMERCIAL

## 2022-09-14 VITALS
DIASTOLIC BLOOD PRESSURE: 78 MMHG | TEMPERATURE: 96.5 F | SYSTOLIC BLOOD PRESSURE: 118 MMHG | HEIGHT: 65 IN | RESPIRATION RATE: 13 BRPM | BODY MASS INDEX: 26.12 KG/M2 | WEIGHT: 156.8 LBS | OXYGEN SATURATION: 99 % | HEART RATE: 70 BPM

## 2022-09-14 DIAGNOSIS — J34.89 NASAL PAIN: ICD-10-CM

## 2022-09-14 PROCEDURE — 99212 OFFICE O/P EST SF 10 MIN: CPT | Performed by: PHYSICIAN ASSISTANT

## 2022-09-14 ASSESSMENT — ENCOUNTER SYMPTOMS
SHORTNESS OF BREATH: 0
DIAPHORESIS: 0
CHILLS: 0
ABDOMINAL PAIN: 0
HEADACHES: 0
WHEEZING: 0
SORE THROAT: 0
EYE REDNESS: 0
EYE DISCHARGE: 0
VOMITING: 0
CONSTIPATION: 0
EYE PAIN: 0
NAUSEA: 0
COUGH: 0
DIZZINESS: 0
SINUS PAIN: 0
FEVER: 0
DIARRHEA: 0

## 2022-09-14 NOTE — PROGRESS NOTES
"  Subjective:     Tony Foreman  is a 27 y.o. female who presents for Other (Feels like a worm is in nose left side)       She presents today for examination of a possible worm/foreign body in her left side of her nose.  States that this sensation has been ongoing over the last 2 months.  Denies any malodor or nasal purulent discharge.  Denies placing anything into her nose.  She states that a fly did fly out of her nose a few weeks ago in the middle of the night.  Has not used nasal lavage for the symptoms.  No nasal congestion, nasal drainage, nasal bleeding.     Review of Systems   Constitutional:  Negative for chills, diaphoresis, fever and malaise/fatigue.   HENT:  Negative for congestion, ear discharge, sinus pain and sore throat.         Foreign body sensation in nose   Eyes:  Negative for pain, discharge and redness.   Respiratory:  Negative for cough, shortness of breath and wheezing.    Cardiovascular:  Negative for chest pain.   Gastrointestinal:  Negative for abdominal pain, constipation, diarrhea, nausea and vomiting.   Genitourinary:  Negative for dysuria, frequency and urgency.   Neurological:  Negative for dizziness and headaches.    No Known Allergies  History reviewed. No pertinent past medical history.     Objective:   /78   Pulse 70   Temp 35.8 °C (96.5 °F) (Temporal)   Resp 13   Ht 1.651 m (5' 5\")   Wt 71.1 kg (156 lb 12.8 oz)   SpO2 99%   BMI 26.09 kg/m²   Physical Exam  Vitals and nursing note reviewed.   Constitutional:       General: She is not in acute distress.     Appearance: She is not ill-appearing or toxic-appearing.   HENT:      Head: Normocephalic.      Nose: Nose normal. No congestion or rhinorrhea.      Comments: No foreign body visualized.  Mild turbinate edema, bilaterally.  No signs of injury or trauma to the internal nasal anatomy.  Eyes:      General: No scleral icterus.     Conjunctiva/sclera: Conjunctivae normal.   Pulmonary:      Effort: Pulmonary effort is " normal. No respiratory distress.      Breath sounds: No stridor.   Musculoskeletal:      Cervical back: Neck supple.   Neurological:      Mental Status: She is alert and oriented to person, place, and time.   Psychiatric:         Mood and Affect: Mood normal.         Behavior: Behavior normal.         Thought Content: Thought content normal.         Judgment: Judgment normal.           Diagnostic testing: None    Assessment/Plan:     Encounter Diagnoses   Name Primary?    Nasal pain         Plan for care for today's complaint includes over-the-counter nasal lavage.  Transfer of care to ENT for further evaluation management of a possible foreign body in the nose.  Continue to monitor symptoms and return to urgent care or follow-up with primary care provider if symptoms remain ongoing.  Follow-up in the emergency department if symptoms become severe, ER precautions discussed in office today..    See AVS Instructions below for written guidance provided to patient on after-visit management and care in addition to our verbal discussion during the visit.    Please note that this dictation was created using voice recognition software. I have attempted to correct all errors, but there may be sound-alike, spelling, grammar and possibly content errors that I did not discover before finalizing the note.    Carlos Vogel PA-C

## 2023-06-08 NOTE — PROGRESS NOTES
New ob visit. Pt sure of LMP, though states it was different than others. Supportive FOB, Will, and family, pt happy with pregnancy. First pregnancy. Denies PMHx, SHx. No tob, etoh or drug use. Taking PNV only. Pt currently denies cramping, bleeding or pain, though pt has occ HA, will incr water intake, take Tylenol prn. No FM.     PE: See below. Size c/w dates. +FHT by Doppler.    A/P: 1. IUP at 10w4d - PAP, GC/CT today. PNL slip given. US 10wk. RTC 4 wks.  2. Poor dentition     Never smoker
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
## 2024-09-04 ENCOUNTER — HOSPITAL ENCOUNTER (OUTPATIENT)
Facility: MEDICAL CENTER | Age: 29
End: 2024-09-04
Payer: COMMERCIAL

## 2024-09-04 ENCOUNTER — OFFICE VISIT (OUTPATIENT)
Dept: MEDICAL GROUP | Facility: MEDICAL CENTER | Age: 29
End: 2024-09-04
Payer: COMMERCIAL

## 2024-09-04 ENCOUNTER — HOSPITAL ENCOUNTER (OUTPATIENT)
Dept: LAB | Facility: MEDICAL CENTER | Age: 29
End: 2024-09-04
Payer: COMMERCIAL

## 2024-09-04 VITALS
BODY MASS INDEX: 23.63 KG/M2 | SYSTOLIC BLOOD PRESSURE: 98 MMHG | DIASTOLIC BLOOD PRESSURE: 68 MMHG | TEMPERATURE: 97.9 F | RESPIRATION RATE: 16 BRPM | OXYGEN SATURATION: 100 % | WEIGHT: 141.8 LBS | HEART RATE: 89 BPM | HEIGHT: 65 IN

## 2024-09-04 DIAGNOSIS — Z13.21 SCREENING FOR ENDOCRINE, NUTRITIONAL, METABOLIC AND IMMUNITY DISORDER: ICD-10-CM

## 2024-09-04 DIAGNOSIS — N92.0 SPOTTING: ICD-10-CM

## 2024-09-04 DIAGNOSIS — R30.0 DYSURIA: ICD-10-CM

## 2024-09-04 DIAGNOSIS — N92.6 IRREGULAR MENSES: ICD-10-CM

## 2024-09-04 DIAGNOSIS — Z13.29 SCREENING FOR ENDOCRINE, NUTRITIONAL, METABOLIC AND IMMUNITY DISORDER: ICD-10-CM

## 2024-09-04 DIAGNOSIS — Z13.228 SCREENING FOR ENDOCRINE, NUTRITIONAL, METABOLIC AND IMMUNITY DISORDER: ICD-10-CM

## 2024-09-04 DIAGNOSIS — Z13.0 SCREENING FOR ENDOCRINE, NUTRITIONAL, METABOLIC AND IMMUNITY DISORDER: ICD-10-CM

## 2024-09-04 LAB
25(OH)D3 SERPL-MCNC: 13 NG/ML (ref 30–100)
ALBUMIN SERPL BCP-MCNC: 3.4 G/DL (ref 3.2–4.9)
ALBUMIN/GLOB SERPL: 1.5 G/DL
ALP SERPL-CCNC: 41 U/L (ref 30–99)
ALT SERPL-CCNC: 8 U/L (ref 2–50)
AMBIGUOUS DTTM AMBI4: NORMAL
ANION GAP SERPL CALC-SCNC: 11 MMOL/L (ref 7–16)
APPEARANCE UR: NORMAL
AST SERPL-CCNC: 19 U/L (ref 12–45)
BILIRUB SERPL-MCNC: 0.6 MG/DL (ref 0.1–1.5)
BILIRUB UR STRIP-MCNC: NORMAL MG/DL
BUN SERPL-MCNC: 8 MG/DL (ref 8–22)
C TRACH DNA SPEC QL NAA+PROBE: NEGATIVE
CALCIUM ALBUM COR SERPL-MCNC: 9 MG/DL (ref 8.5–10.5)
CALCIUM SERPL-MCNC: 8.5 MG/DL (ref 8.5–10.5)
CHLORIDE SERPL-SCNC: 108 MMOL/L (ref 96–112)
CHOLEST SERPL-MCNC: 136 MG/DL (ref 100–199)
CO2 SERPL-SCNC: 22 MMOL/L (ref 20–33)
COLOR UR AUTO: NORMAL
CREAT SERPL-MCNC: 0.76 MG/DL (ref 0.5–1.4)
ERYTHROCYTE [DISTWIDTH] IN BLOOD BY AUTOMATED COUNT: 42.7 FL (ref 35.9–50)
EST. AVERAGE GLUCOSE BLD GHB EST-MCNC: 85 MG/DL
GFR SERPLBLD CREATININE-BSD FMLA CKD-EPI: 109 ML/MIN/1.73 M 2
GLOBULIN SER CALC-MCNC: 2.3 G/DL (ref 1.9–3.5)
GLUCOSE SERPL-MCNC: 86 MG/DL (ref 65–99)
GLUCOSE UR STRIP.AUTO-MCNC: NEGATIVE MG/DL
HBA1C MFR BLD: 4.6 % (ref 4–5.6)
HCT VFR BLD AUTO: 39.7 % (ref 37–47)
HDLC SERPL-MCNC: 57 MG/DL
HGB BLD-MCNC: 13.1 G/DL (ref 12–16)
KETONES UR STRIP.AUTO-MCNC: NORMAL MG/DL
LDLC SERPL CALC-MCNC: 68 MG/DL
LEUKOCYTE ESTERASE UR QL STRIP.AUTO: NORMAL
MCH RBC QN AUTO: 30.1 PG (ref 27–33)
MCHC RBC AUTO-ENTMCNC: 33 G/DL (ref 32.2–35.5)
MCV RBC AUTO: 91.3 FL (ref 81.4–97.8)
N GONORRHOEA DNA SPEC QL NAA+PROBE: NEGATIVE
NITRITE UR QL STRIP.AUTO: NEGATIVE
PH UR STRIP.AUTO: 6 [PH] (ref 5–8)
PLATELET # BLD AUTO: 331 K/UL (ref 164–446)
PMV BLD AUTO: 9.5 FL (ref 9–12.9)
POTASSIUM SERPL-SCNC: 3.9 MMOL/L (ref 3.6–5.5)
PROT SERPL-MCNC: 5.7 G/DL (ref 6–8.2)
PROT UR QL STRIP: NORMAL MG/DL
RBC # BLD AUTO: 4.35 M/UL (ref 4.2–5.4)
RBC UR QL AUTO: NORMAL
SODIUM SERPL-SCNC: 141 MMOL/L (ref 135–145)
SP GR UR STRIP.AUTO: 1.03
SPECIMEN SOURCE: NORMAL
TRIGL SERPL-MCNC: 53 MG/DL (ref 0–149)
TSH SERPL DL<=0.005 MIU/L-ACNC: 1.25 UIU/ML (ref 0.38–5.33)
UROBILINOGEN UR STRIP-MCNC: 1 MG/DL
WBC # BLD AUTO: 6.2 K/UL (ref 4.8–10.8)

## 2024-09-04 PROCEDURE — 87491 CHLMYD TRACH DNA AMP PROBE: CPT

## 2024-09-04 PROCEDURE — 87480 CANDIDA DNA DIR PROBE: CPT

## 2024-09-04 PROCEDURE — 87086 URINE CULTURE/COLONY COUNT: CPT

## 2024-09-04 PROCEDURE — 85027 COMPLETE CBC AUTOMATED: CPT

## 2024-09-04 PROCEDURE — 87491 CHLMYD TRACH DNA AMP PROBE: CPT | Mod: 91

## 2024-09-04 PROCEDURE — 87591 N.GONORRHOEAE DNA AMP PROB: CPT | Mod: 91

## 2024-09-04 PROCEDURE — 86787 VARICELLA-ZOSTER ANTIBODY: CPT

## 2024-09-04 PROCEDURE — 80061 LIPID PANEL: CPT

## 2024-09-04 PROCEDURE — 87591 N.GONORRHOEAE DNA AMP PROB: CPT

## 2024-09-04 PROCEDURE — 80053 COMPREHEN METABOLIC PANEL: CPT

## 2024-09-04 PROCEDURE — 99214 OFFICE O/P EST MOD 30 MIN: CPT

## 2024-09-04 PROCEDURE — 81002 URINALYSIS NONAUTO W/O SCOPE: CPT

## 2024-09-04 PROCEDURE — 83036 HEMOGLOBIN GLYCOSYLATED A1C: CPT

## 2024-09-04 PROCEDURE — 82306 VITAMIN D 25 HYDROXY: CPT

## 2024-09-04 PROCEDURE — 87510 GARDNER VAG DNA DIR PROBE: CPT

## 2024-09-04 PROCEDURE — 36415 COLL VENOUS BLD VENIPUNCTURE: CPT

## 2024-09-04 PROCEDURE — 99213 OFFICE O/P EST LOW 20 MIN: CPT

## 2024-09-04 PROCEDURE — 84443 ASSAY THYROID STIM HORMONE: CPT

## 2024-09-04 PROCEDURE — 87660 TRICHOMONAS VAGIN DIR PROBE: CPT

## 2024-09-04 PROCEDURE — 87086 URINE CULTURE/COLONY COUNT: CPT | Mod: 91

## 2024-09-04 RX ORDER — ETONOGESTREL 68 MG/1
1 IMPLANT SUBCUTANEOUS ONCE
COMMUNITY

## 2024-09-04 SDOH — ECONOMIC STABILITY: TRANSPORTATION INSECURITY
IN THE PAST 12 MONTHS, HAS THE LACK OF TRANSPORTATION KEPT YOU FROM MEDICAL APPOINTMENTS OR FROM GETTING MEDICATIONS?: NO

## 2024-09-04 SDOH — HEALTH STABILITY: PHYSICAL HEALTH: ON AVERAGE, HOW MANY MINUTES DO YOU ENGAGE IN EXERCISE AT THIS LEVEL?: 150+ MIN

## 2024-09-04 SDOH — ECONOMIC STABILITY: INCOME INSECURITY: IN THE LAST 12 MONTHS, WAS THERE A TIME WHEN YOU WERE NOT ABLE TO PAY THE MORTGAGE OR RENT ON TIME?: PATIENT DECLINED

## 2024-09-04 SDOH — ECONOMIC STABILITY: FOOD INSECURITY: WITHIN THE PAST 12 MONTHS, THE FOOD YOU BOUGHT JUST DIDN'T LAST AND YOU DIDN'T HAVE MONEY TO GET MORE.: PATIENT DECLINED

## 2024-09-04 SDOH — HEALTH STABILITY: MENTAL HEALTH
STRESS IS WHEN SOMEONE FEELS TENSE, NERVOUS, ANXIOUS, OR CAN'T SLEEP AT NIGHT BECAUSE THEIR MIND IS TROUBLED. HOW STRESSED ARE YOU?: TO SOME EXTENT

## 2024-09-04 SDOH — ECONOMIC STABILITY: FOOD INSECURITY: WITHIN THE PAST 12 MONTHS, YOU WORRIED THAT YOUR FOOD WOULD RUN OUT BEFORE YOU GOT MONEY TO BUY MORE.: PATIENT DECLINED

## 2024-09-04 SDOH — HEALTH STABILITY: PHYSICAL HEALTH: ON AVERAGE, HOW MANY DAYS PER WEEK DO YOU ENGAGE IN MODERATE TO STRENUOUS EXERCISE (LIKE A BRISK WALK)?: 2 DAYS

## 2024-09-04 SDOH — ECONOMIC STABILITY: TRANSPORTATION INSECURITY
IN THE PAST 12 MONTHS, HAS LACK OF TRANSPORTATION KEPT YOU FROM MEETINGS, WORK, OR FROM GETTING THINGS NEEDED FOR DAILY LIVING?: NO

## 2024-09-04 SDOH — ECONOMIC STABILITY: TRANSPORTATION INSECURITY
IN THE PAST 12 MONTHS, HAS LACK OF RELIABLE TRANSPORTATION KEPT YOU FROM MEDICAL APPOINTMENTS, MEETINGS, WORK OR FROM GETTING THINGS NEEDED FOR DAILY LIVING?: NO

## 2024-09-04 SDOH — ECONOMIC STABILITY: HOUSING INSECURITY
IN THE LAST 12 MONTHS, WAS THERE A TIME WHEN YOU DID NOT HAVE A STEADY PLACE TO SLEEP OR SLEPT IN A SHELTER (INCLUDING NOW)?: PATIENT DECLINED

## 2024-09-04 SDOH — ECONOMIC STABILITY: INCOME INSECURITY: HOW HARD IS IT FOR YOU TO PAY FOR THE VERY BASICS LIKE FOOD, HOUSING, MEDICAL CARE, AND HEATING?: PATIENT DECLINED

## 2024-09-04 ASSESSMENT — SOCIAL DETERMINANTS OF HEALTH (SDOH)
HOW OFTEN DO YOU ATTENT MEETINGS OF THE CLUB OR ORGANIZATION YOU BELONG TO?: NEVER
HOW OFTEN DO YOU HAVE A DRINK CONTAINING ALCOHOL: 2-4 TIMES A MONTH
HOW OFTEN DO YOU GET TOGETHER WITH FRIENDS OR RELATIVES?: TWICE A WEEK
HOW OFTEN DO YOU ATTEND CHURCH OR RELIGIOUS SERVICES?: NEVER
HOW OFTEN DO YOU ATTENT MEETINGS OF THE CLUB OR ORGANIZATION YOU BELONG TO?: NEVER
HOW OFTEN DO YOU HAVE SIX OR MORE DRINKS ON ONE OCCASION: NEVER
IN THE PAST 12 MONTHS, HAS THE ELECTRIC, GAS, OIL, OR WATER COMPANY THREATENED TO SHUT OFF SERVICE IN YOUR HOME?: NO
DO YOU BELONG TO ANY CLUBS OR ORGANIZATIONS SUCH AS CHURCH GROUPS UNIONS, FRATERNAL OR ATHLETIC GROUPS, OR SCHOOL GROUPS?: NO
HOW OFTEN DO YOU GET TOGETHER WITH FRIENDS OR RELATIVES?: TWICE A WEEK
WITHIN THE PAST 12 MONTHS, YOU WORRIED THAT YOUR FOOD WOULD RUN OUT BEFORE YOU GOT THE MONEY TO BUY MORE: PATIENT DECLINED
DO YOU BELONG TO ANY CLUBS OR ORGANIZATIONS SUCH AS CHURCH GROUPS UNIONS, FRATERNAL OR ATHLETIC GROUPS, OR SCHOOL GROUPS?: NO
ARE YOU MARRIED, WIDOWED, DIVORCED, SEPARATED, NEVER MARRIED, OR LIVING WITH A PARTNER?: NEVER MARRIED
IN A TYPICAL WEEK, HOW MANY TIMES DO YOU TALK ON THE PHONE WITH FAMILY, FRIENDS, OR NEIGHBORS?: MORE THAN THREE TIMES A WEEK
HOW OFTEN DO YOU ATTEND CHURCH OR RELIGIOUS SERVICES?: NEVER
IN A TYPICAL WEEK, HOW MANY TIMES DO YOU TALK ON THE PHONE WITH FAMILY, FRIENDS, OR NEIGHBORS?: MORE THAN THREE TIMES A WEEK
HOW HARD IS IT FOR YOU TO PAY FOR THE VERY BASICS LIKE FOOD, HOUSING, MEDICAL CARE, AND HEATING?: PATIENT DECLINED
HOW MANY DRINKS CONTAINING ALCOHOL DO YOU HAVE ON A TYPICAL DAY WHEN YOU ARE DRINKING: 1 OR 2
ARE YOU MARRIED, WIDOWED, DIVORCED, SEPARATED, NEVER MARRIED, OR LIVING WITH A PARTNER?: NEVER MARRIED

## 2024-09-04 ASSESSMENT — LIFESTYLE VARIABLES
HOW OFTEN DO YOU HAVE A DRINK CONTAINING ALCOHOL: 2-4 TIMES A MONTH
SKIP TO QUESTIONS 9-10: 1
HOW OFTEN DO YOU HAVE SIX OR MORE DRINKS ON ONE OCCASION: NEVER
HOW MANY STANDARD DRINKS CONTAINING ALCOHOL DO YOU HAVE ON A TYPICAL DAY: 1 OR 2
AUDIT-C TOTAL SCORE: 2

## 2024-09-04 ASSESSMENT — PATIENT HEALTH QUESTIONNAIRE - PHQ9: CLINICAL INTERPRETATION OF PHQ2 SCORE: 0

## 2024-09-04 NOTE — PROGRESS NOTES
Verbal consent was acquired by the patient to use "[employer] ambient listening note generation during this visit     Subjective:     CC:  Diagnoses of Irregular menses, Dysuria, Spotting, and Screening for endocrine, nutritional, metabolic and immunity disorder were pertinent to this visit.    HISTORY OF THE PRESENT ILLNESS: Patient is a 29 y.o. female.     History of Present Illness  The patient presents for the establishment of care.    She is seeking to establish a relationship with a primary care physician. She has not had a comprehensive checkup since giving birth and has not been under regular medical supervision for several years.    She reports experiencing menstrual cycles every two weeks for the past month and a half, with her last period occurring two weeks ago. Currently, she is noticing spotting and cramping, which seem to intensify after intercourse. She has a history of bacterial vaginosis and occasionally experiences vaginal discharge, odors, and burning.    She has a Nexplanon implant, which she believes is nearing its expiration date, and is considering having it replaced as it has been effective for her. Additionally, she reports painful urination and is interested in undergoing STD screening. She does not engage in unprotected sex with multiple partners.    She is uncertain about her chickenpox history and is not currently breastfeeding. She has been donating plasma and has been informed that her protein levels are low. She has not consumed any food or coffee today.    Health Maintenance: reviewed and completed per patient preference.     ROS:   - CONSTITUTIONAL: Denies weight loss, fever and chills.  - HEENT: Denies changes in vision and hearing.  - RESPIRATORY: Denies SOB and cough.  - CV: Denies palpitations and CP.  - GI: Denies abdominal pain, nausea, vomiting and diarrhea.  - : Endorses dysuria and urinary frequency.  - MSK: Denies myalgia and joint pain.  - SKIN: Denies rash and  pruritus.  - NEUROLOGICAL: Denies headache and syncope.  - PSYCHIATRIC: Denies recent changes in mood. Denies anxiety and depression.           Social History     Socioeconomic History    Marital status: Single     Spouse name: Not on file    Number of children: Not on file    Years of education: Not on file    Highest education level: 12th grade   Occupational History    Not on file   Tobacco Use    Smoking status: Never    Smokeless tobacco: Never   Vaping Use    Vaping status: Never Used   Substance and Sexual Activity    Alcohol use: No    Drug use: No    Sexual activity: Yes     Partners: Male   Other Topics Concern    Not on file   Social History Narrative    Not on file     Social Determinants of Health     Financial Resource Strain: Patient Declined (9/4/2024)    Overall Financial Resource Strain (CARDIA)     Difficulty of Paying Living Expenses: Patient declined   Food Insecurity: Patient Declined (9/4/2024)    Hunger Vital Sign     Worried About Running Out of Food in the Last Year: Patient declined     Ran Out of Food in the Last Year: Patient declined   Transportation Needs: No Transportation Needs (9/4/2024)    PRAPARE - Transportation     Lack of Transportation (Medical): No     Lack of Transportation (Non-Medical): No   Physical Activity: Sufficiently Active (9/4/2024)    Exercise Vital Sign     Days of Exercise per Week: 2 days     Minutes of Exercise per Session: 150+ min   Stress: Stress Concern Present (9/4/2024)    [employer] - Occupational Stress Questionnaire     Feeling of Stress : To some extent   Social Connections: Socially Isolated (9/4/2024)    Social Connection and Isolation Panel [NHANES]     Frequency of Communication with Friends and Family: More than three times a week     Frequency of Social Gatherings with Friends and Family: Twice a week     Attends Confucianism Services: Never     Active Member of Clubs or Organizations: No     Attends Club or  "Organization Meetings: Never     Marital Status: Never    Intimate Partner Violence: Not on file   Housing Stability: Patient Declined (9/4/2024)    Housing Stability Vital Sign     Unable to Pay for Housing in the Last Year: Patient declined     Number of Times Moved in the Last Year: Not on file     Homeless in the Last Year: Patient declined      No Known Allergies         Current Outpatient Medications:     Nexplanon, 1 Each, Subcutaneous, Once   Objective:     Exam: BP 98/68 (BP Location: Right arm, Patient Position: Sitting, BP Cuff Size: Adult)   Pulse 89   Temp 36.6 °C (97.9 °F) (Temporal)   Resp 16   Ht 1.651 m (5' 5\")   Wt 64.3 kg (141 lb 12.8 oz)   SpO2 100%  Body mass index is 23.6 kg/m².    Physical Exam:  Constitutional: Alert, no distress, well-groomed.  Skin: Warm, dry, good turgor, no rashes in visible areas.  Eye: Equal, round and reactive, conjunctiva clear, lids normal.  ENMT: Lips without lesions, good dentition, moist mucous membranes.  Neck: Trachea midline, no masses, no thyromegaly.  Respiratory: Unlabored respiratory effort, no cough.  Abd: soft, non tender, non distended, normal BS,  no CVA tenderness  MSK: Normal gait, moves all extremities.  Neuro: Grossly non-focal.   Psych: Alert and oriented x3, normal affect and mood.    Labs: Reviewed    Assessment & Plan:   29 y.o. female with the following -    1. Irregular menses  Acute issue. She reports having her period every 2 weeks for the past month and a half, with spotting and cramping, especially after intercourse.  It could potentially be related to her Nexplanon.  She would like to continue with the Nexplanon due to its use and efficacy.  We have scheduled her an appointment to have the Nexplanon removed from her right arm and inserted into her left arm.  - VAGINAL PATHOGENS DNA PANEL; Future  - POCT Urinalysis  - URINE CULTURE(NEW); Future  - Chlamydia/GC, PCR (Urine); Future    2. Dysuria  Acute issue.  Point-of-care " urinalysis with trace leukocytes but no nitrites. A vaginal swab will be taken today to test for bacterial vaginosis, and a urinalysis will be performed to rule out a urinary tract infection. The samples will be sent for culture and STD screening, including Chlamydia and gonorrhea testing. She will be contacted if any results are abnormal.  At this time over-the-counter Azo and topical medications were recommended.  - VAGINAL PATHOGENS DNA PANEL; Future  - POCT Urinalysis  - URINE CULTURE(NEW); Future  - Chlamydia/GC, PCR (Urine); Future    3. Spotting  - VAGINAL PATHOGENS DNA PANEL; Future  - POCT Urinalysis  - URINE CULTURE(NEW); Future  - Chlamydia/GC, PCR (Urine); Future    4. Screening for endocrine, nutritional, metabolic and immunity disorder  - Comp Metabolic Panel; Future  - CBC WITHOUT DIFFERENTIAL; Future  - Lipid Profile; Future  - HEMOGLOBIN A1C; Future  - TSH WITH REFLEX TO FT4; Future  - VITAMIN D,25 HYDROXY (DEFICIENCY); Future  - VARICELLA ZOSTER IGG AB; Future  There are no diagnoses linked to this encounter.        Return in about 1 week (around 9/11/2024) for PAP.    Please note that this dictation was created using voice recognition software. I have made every reasonable attempt to correct obvious errors, but I expect that there are errors of grammar and possibly content that I did not discover before finalizing the note.

## 2024-09-05 DIAGNOSIS — E55.9 VITAMIN D DEFICIENCY: ICD-10-CM

## 2024-09-05 DIAGNOSIS — B96.89 BV (BACTERIAL VAGINOSIS): ICD-10-CM

## 2024-09-05 DIAGNOSIS — N76.0 BV (BACTERIAL VAGINOSIS): ICD-10-CM

## 2024-09-05 LAB
C TRACH DNA SPEC QL NAA+PROBE: NEGATIVE
CANDIDA DNA VAG QL PROBE+SIG AMP: NEGATIVE
G VAGINALIS DNA VAG QL PROBE+SIG AMP: POSITIVE
N GONORRHOEA DNA SPEC QL NAA+PROBE: NEGATIVE
SPECIMEN SOURCE: NORMAL
T VAGINALIS DNA VAG QL PROBE+SIG AMP: NEGATIVE

## 2024-09-05 RX ORDER — ERGOCALCIFEROL 1.25 MG/1
50000 CAPSULE ORAL
Qty: 12 CAPSULE | Refills: 0 | Status: SHIPPED | OUTPATIENT
Start: 2024-09-05

## 2024-09-05 RX ORDER — METRONIDAZOLE 500 MG/1
500 TABLET ORAL 2 TIMES DAILY
Qty: 14 TABLET | Refills: 0 | Status: SHIPPED | OUTPATIENT
Start: 2024-09-05 | End: 2024-09-12

## 2024-09-06 LAB
BACTERIA UR CULT: NORMAL
SIGNIFICANT IND 70042: NORMAL
SITE SITE: NORMAL
SOURCE SOURCE: NORMAL
VZV IGG SER IA-ACNC: 90 IV

## 2024-09-07 LAB
BACTERIA UR CULT: NORMAL
SIGNIFICANT IND 70042: NORMAL
SITE SITE: NORMAL
SOURCE SOURCE: NORMAL

## 2024-10-02 DIAGNOSIS — Z78.9 USES CONTRACEPTIVE IMPLANTS FOR BIRTH CONTROL: ICD-10-CM

## 2024-10-15 ENCOUNTER — HOSPITAL ENCOUNTER (OUTPATIENT)
Facility: MEDICAL CENTER | Age: 29
End: 2024-10-15
Payer: COMMERCIAL

## 2024-10-15 ENCOUNTER — OFFICE VISIT (OUTPATIENT)
Dept: MEDICAL GROUP | Facility: MEDICAL CENTER | Age: 29
End: 2024-10-15
Payer: COMMERCIAL

## 2024-10-15 VITALS
DIASTOLIC BLOOD PRESSURE: 60 MMHG | HEART RATE: 74 BPM | WEIGHT: 142.2 LBS | BODY MASS INDEX: 23.66 KG/M2 | SYSTOLIC BLOOD PRESSURE: 90 MMHG | OXYGEN SATURATION: 98 % | TEMPERATURE: 98.1 F | RESPIRATION RATE: 16 BRPM

## 2024-10-15 DIAGNOSIS — Z11.51 SCREENING FOR HPV (HUMAN PAPILLOMAVIRUS): ICD-10-CM

## 2024-10-15 DIAGNOSIS — Z11.3 SCREENING EXAMINATION FOR SEXUALLY TRANSMITTED DISEASE: ICD-10-CM

## 2024-10-15 DIAGNOSIS — Z01.419 WELL WOMAN EXAM: ICD-10-CM

## 2024-10-15 DIAGNOSIS — E55.9 VITAMIN D DEFICIENCY: ICD-10-CM

## 2024-10-15 PROCEDURE — 87491 CHLMYD TRACH DNA AMP PROBE: CPT

## 2024-10-15 PROCEDURE — 87660 TRICHOMONAS VAGIN DIR PROBE: CPT

## 2024-10-15 PROCEDURE — 87591 N.GONORRHOEAE DNA AMP PROB: CPT

## 2024-10-15 PROCEDURE — 87480 CANDIDA DNA DIR PROBE: CPT

## 2024-10-15 PROCEDURE — 99214 OFFICE O/P EST MOD 30 MIN: CPT

## 2024-10-15 PROCEDURE — 3074F SYST BP LT 130 MM HG: CPT

## 2024-10-15 PROCEDURE — 87510 GARDNER VAG DNA DIR PROBE: CPT

## 2024-10-15 PROCEDURE — 88142 CYTOPATH C/V THIN LAYER: CPT

## 2024-10-15 PROCEDURE — 99213 OFFICE O/P EST LOW 20 MIN: CPT

## 2024-10-15 PROCEDURE — 3078F DIAST BP <80 MM HG: CPT

## 2024-10-15 PROCEDURE — 99395 PREV VISIT EST AGE 18-39: CPT | Mod: 25

## 2024-10-15 RX ORDER — ERGOCALCIFEROL 1.25 MG/1
50000 CAPSULE ORAL
Qty: 12 CAPSULE | Refills: 0 | Status: SHIPPED | OUTPATIENT
Start: 2024-10-15

## 2024-10-15 ASSESSMENT — FIBROSIS 4 INDEX: FIB4 SCORE: 0.57

## 2024-10-16 DIAGNOSIS — Z11.51 SCREENING FOR HPV (HUMAN PAPILLOMAVIRUS): ICD-10-CM

## 2024-10-16 DIAGNOSIS — Z11.3 SCREENING EXAMINATION FOR SEXUALLY TRANSMITTED DISEASE: ICD-10-CM

## 2024-10-16 DIAGNOSIS — Z01.419 WELL WOMAN EXAM: ICD-10-CM

## 2024-10-16 LAB
AMBIGUOUS DTTM AMBI4: NORMAL
C TRACH DNA GENITAL QL NAA+PROBE: NEGATIVE
CANDIDA DNA VAG QL PROBE+SIG AMP: NEGATIVE
G VAGINALIS DNA VAG QL PROBE+SIG AMP: POSITIVE
N GONORRHOEA DNA GENITAL QL NAA+PROBE: NEGATIVE
SPECIMEN SOURCE: NORMAL
T VAGINALIS DNA VAG QL PROBE+SIG AMP: NEGATIVE

## 2024-10-17 DIAGNOSIS — N76.0 BV (BACTERIAL VAGINOSIS): ICD-10-CM

## 2024-10-17 DIAGNOSIS — B96.89 BV (BACTERIAL VAGINOSIS): ICD-10-CM

## 2024-10-17 RX ORDER — METRONIDAZOLE 500 MG/1
500 TABLET ORAL 2 TIMES DAILY
Qty: 14 TABLET | Refills: 0 | Status: SHIPPED | OUTPATIENT
Start: 2024-10-17 | End: 2024-10-17

## 2024-10-17 RX ORDER — METRONIDAZOLE 500 MG/1
500 TABLET ORAL 2 TIMES DAILY
Qty: 14 TABLET | Refills: 0 | Status: SHIPPED | OUTPATIENT
Start: 2024-10-17 | End: 2024-10-24

## 2024-10-24 LAB — THINPREP PAP, CYTOLOGY NL11781: NORMAL

## 2025-04-02 ENCOUNTER — TELEPHONE (OUTPATIENT)
Dept: OBGYN | Facility: CLINIC | Age: 30
End: 2025-04-02

## 2025-04-21 ENCOUNTER — HOSPITAL ENCOUNTER (EMERGENCY)
Facility: MEDICAL CENTER | Age: 30
End: 2025-04-21
Attending: EMERGENCY MEDICINE
Payer: COMMERCIAL

## 2025-04-21 ENCOUNTER — APPOINTMENT (OUTPATIENT)
Dept: RADIOLOGY | Facility: MEDICAL CENTER | Age: 30
End: 2025-04-21
Attending: EMERGENCY MEDICINE
Payer: COMMERCIAL

## 2025-04-21 VITALS
HEART RATE: 86 BPM | OXYGEN SATURATION: 98 % | BODY MASS INDEX: 24.32 KG/M2 | RESPIRATION RATE: 14 BRPM | HEIGHT: 65 IN | DIASTOLIC BLOOD PRESSURE: 59 MMHG | WEIGHT: 145.94 LBS | TEMPERATURE: 98.2 F | SYSTOLIC BLOOD PRESSURE: 97 MMHG

## 2025-04-21 DIAGNOSIS — R10.9 FLANK PAIN: ICD-10-CM

## 2025-04-21 DIAGNOSIS — N12 PYELONEPHRITIS: ICD-10-CM

## 2025-04-21 LAB
ALBUMIN SERPL BCP-MCNC: 3.5 G/DL (ref 3.2–4.9)
ALBUMIN/GLOB SERPL: 0.9 G/DL
ALP SERPL-CCNC: 60 U/L (ref 30–99)
ALT SERPL-CCNC: 19 U/L (ref 2–50)
ANION GAP SERPL CALC-SCNC: 14 MMOL/L (ref 7–16)
APPEARANCE UR: ABNORMAL
AST SERPL-CCNC: 24 U/L (ref 12–45)
BACTERIA #/AREA URNS HPF: ABNORMAL /HPF
BASOPHILS # BLD AUTO: 0.2 % (ref 0–1.8)
BASOPHILS # BLD: 0.03 K/UL (ref 0–0.12)
BILIRUB SERPL-MCNC: 0.5 MG/DL (ref 0.1–1.5)
BILIRUB UR QL STRIP.AUTO: NEGATIVE
BUN SERPL-MCNC: 6 MG/DL (ref 8–22)
CALCIUM ALBUM COR SERPL-MCNC: 8.9 MG/DL (ref 8.5–10.5)
CALCIUM SERPL-MCNC: 8.5 MG/DL (ref 8.5–10.5)
CASTS URNS QL MICRO: ABNORMAL /LPF (ref 0–2)
CHLORIDE SERPL-SCNC: 102 MMOL/L (ref 96–112)
CO2 SERPL-SCNC: 20 MMOL/L (ref 20–33)
COLOR UR: YELLOW
CREAT SERPL-MCNC: 0.94 MG/DL (ref 0.5–1.4)
EOSINOPHIL # BLD AUTO: 0.01 K/UL (ref 0–0.51)
EOSINOPHIL NFR BLD: 0.1 % (ref 0–6.9)
EPITHELIAL CELLS 1715: ABNORMAL /HPF (ref 0–5)
ERYTHROCYTE [DISTWIDTH] IN BLOOD BY AUTOMATED COUNT: 42.9 FL (ref 35.9–50)
GFR SERPLBLD CREATININE-BSD FMLA CKD-EPI: 84 ML/MIN/1.73 M 2
GLOBULIN SER CALC-MCNC: 4 G/DL (ref 1.9–3.5)
GLUCOSE SERPL-MCNC: 102 MG/DL (ref 65–99)
GLUCOSE UR STRIP.AUTO-MCNC: NEGATIVE MG/DL
HCG SERPL QL: NEGATIVE
HCT VFR BLD AUTO: 35.5 % (ref 37–47)
HGB BLD-MCNC: 11.9 G/DL (ref 12–16)
IMM GRANULOCYTES # BLD AUTO: 0.07 K/UL (ref 0–0.11)
IMM GRANULOCYTES NFR BLD AUTO: 0.6 % (ref 0–0.9)
KETONES UR STRIP.AUTO-MCNC: ABNORMAL MG/DL
LEUKOCYTE ESTERASE UR QL STRIP.AUTO: ABNORMAL
LIPASE SERPL-CCNC: 33 U/L (ref 11–82)
LYMPHOCYTES # BLD AUTO: 0.97 K/UL (ref 1–4.8)
LYMPHOCYTES NFR BLD: 8 % (ref 22–41)
MCH RBC QN AUTO: 26.7 PG (ref 27–33)
MCHC RBC AUTO-ENTMCNC: 33.5 G/DL (ref 32.2–35.5)
MCV RBC AUTO: 79.6 FL (ref 81.4–97.8)
MICRO URNS: ABNORMAL
MONOCYTES # BLD AUTO: 1.21 K/UL (ref 0–0.85)
MONOCYTES NFR BLD AUTO: 10 % (ref 0–13.4)
NEUTROPHILS # BLD AUTO: 9.77 K/UL (ref 1.82–7.42)
NEUTROPHILS NFR BLD: 81.1 % (ref 44–72)
NITRITE UR QL STRIP.AUTO: POSITIVE
NRBC # BLD AUTO: 0 K/UL
NRBC BLD-RTO: 0 /100 WBC (ref 0–0.2)
PH UR STRIP.AUTO: 6 [PH] (ref 5–8)
PLATELET # BLD AUTO: 320 K/UL (ref 164–446)
PMV BLD AUTO: 8.8 FL (ref 9–12.9)
POTASSIUM SERPL-SCNC: 3.7 MMOL/L (ref 3.6–5.5)
PROT SERPL-MCNC: 7.5 G/DL (ref 6–8.2)
PROT UR QL STRIP: 30 MG/DL
RBC # BLD AUTO: 4.46 M/UL (ref 4.2–5.4)
RBC # URNS HPF: ABNORMAL /HPF (ref 0–2)
RBC UR QL AUTO: ABNORMAL
SODIUM SERPL-SCNC: 136 MMOL/L (ref 135–145)
SP GR UR STRIP.AUTO: 1.02
UROBILINOGEN UR STRIP.AUTO-MCNC: 2 EU/DL
WBC # BLD AUTO: 12.1 K/UL (ref 4.8–10.8)
WBC #/AREA URNS HPF: >100 /HPF

## 2025-04-21 PROCEDURE — 87077 CULTURE AEROBIC IDENTIFY: CPT | Mod: 91

## 2025-04-21 PROCEDURE — 700111 HCHG RX REV CODE 636 W/ 250 OVERRIDE (IP): Mod: JZ | Performed by: EMERGENCY MEDICINE

## 2025-04-21 PROCEDURE — 87086 URINE CULTURE/COLONY COUNT: CPT

## 2025-04-21 PROCEDURE — 87186 SC STD MICRODIL/AGAR DIL: CPT

## 2025-04-21 PROCEDURE — 36415 COLL VENOUS BLD VENIPUNCTURE: CPT

## 2025-04-21 PROCEDURE — 85025 COMPLETE CBC W/AUTO DIFF WBC: CPT

## 2025-04-21 PROCEDURE — 74176 CT ABD & PELVIS W/O CONTRAST: CPT

## 2025-04-21 PROCEDURE — 84703 CHORIONIC GONADOTROPIN ASSAY: CPT

## 2025-04-21 PROCEDURE — 80053 COMPREHEN METABOLIC PANEL: CPT

## 2025-04-21 PROCEDURE — 96374 THER/PROPH/DIAG INJ IV PUSH: CPT

## 2025-04-21 PROCEDURE — 99284 EMERGENCY DEPT VISIT MOD MDM: CPT

## 2025-04-21 PROCEDURE — 81001 URINALYSIS AUTO W/SCOPE: CPT

## 2025-04-21 PROCEDURE — 83690 ASSAY OF LIPASE: CPT

## 2025-04-21 RX ORDER — CEFTRIAXONE 2 G/1
2000 INJECTION, POWDER, FOR SOLUTION INTRAMUSCULAR; INTRAVENOUS ONCE
Status: COMPLETED | OUTPATIENT
Start: 2025-04-21 | End: 2025-04-21

## 2025-04-21 RX ORDER — SULFAMETHOXAZOLE AND TRIMETHOPRIM 800; 160 MG/1; MG/1
1 TABLET ORAL 2 TIMES DAILY
Qty: 14 TABLET | Refills: 0 | Status: ACTIVE | OUTPATIENT
Start: 2025-04-21 | End: 2025-04-28

## 2025-04-21 RX ADMIN — CEFTRIAXONE SODIUM 2000 MG: 2 INJECTION, POWDER, FOR SOLUTION INTRAMUSCULAR; INTRAVENOUS at 12:12

## 2025-04-21 ASSESSMENT — FIBROSIS 4 INDEX: FIB4 SCORE: 0.57

## 2025-04-21 ASSESSMENT — PAIN DESCRIPTION - PAIN TYPE: TYPE: ACUTE PAIN

## 2025-04-21 NOTE — ED PROVIDER NOTES
ER Provider Note    Scribed for Brent Abdi M.D. by Jose Marin. 2025   11:04 AM    Primary Care Provider: MARCIA Clarke    CHIEF COMPLAINT  Chief Complaint   Patient presents with    Flank Pain     Right sided, intermittent for the last 3 weeks. Reports HA, and intermittent fevers and chills. Also reports foul urine odor     EXTERNAL RECORDS REVIEWED  Outpatient Notes Patient last seen on 4/10/25 by Chuck Alcantara for routine dental visit.    HPI/ROS  LIMITATION TO HISTORY   Select: : None  OUTSIDE HISTORIAN(S):  None    Tony Foreman is a 29 y.o. female who presents to the ED for evaluation of right flank pain onset a few weeks ago. The patient states that the pain started randomly with no known cause. She states that the pain is constant in that area and does not migrate. Patient mentions that she has also had hot flashes, fever, chills, and headache recently. She reports associated nausea, but denies vomiting or diarrhea. She also denies painful urination or blood in the urine.    PAST MEDICAL HISTORY  History reviewed. No pertinent past medical history.    SURGICAL HISTORY  Past Surgical History:   Procedure Laterality Date    REPEAT C SECTION Bilateral 2021    Procedure:  SECTION, REPEAT;  Surgeon: Sally Giron D.O.;  Location: [facility];  Service: Obstetrics    PRIMARY C SECTION N/A 2018    Procedure: PRIMARY C SECTION;  Surgeon: Rina Gaston M.D.;  Location: LABOR AND DELIVERY;  Service: Obstetrics       FAMILY HISTORY  Family History   Problem Relation Age of Onset    Diabetes Mother     Hypertension Mother     Diabetes Father     Hypertension Father     No Known Problems Sister     Heart Disease Brother     Hypertension Maternal Grandmother     Diabetes Maternal Grandmother     Hyperlipidemia Maternal Grandmother     Hypertension Maternal Grandfather     Diabetes Maternal Grandfather     Hyperlipidemia Maternal Grandfather   "   Hypertension Paternal Grandmother     Diabetes Paternal Grandmother     Hyperlipidemia Paternal Grandmother     Hypertension Paternal Grandfather     Diabetes Paternal Grandfather     Hyperlipidemia Paternal Grandfather        SOCIAL HISTORY   reports that she has never smoked. She has never used smokeless tobacco. She reports that she does not drink alcohol and does not use drugs.    CURRENT MEDICATIONS  Previous Medications    ERGOCALCIFEROL (DRISDOL) 28814 UNIT CAPSULE    Take 1 Capsule by mouth every 7 days.    ETONOGESTREL (NEXPLANON) 68 MG IMPLANT IMPLANT    Inject 1 Each under the skin one time.       ALLERGIES  No Known Allergies     PHYSICAL EXAM  /72   Pulse (!) 112   Temp 36 °C (96.8 °F) (Temporal)   Resp 18   Ht 1.651 m (5' 5\")   Wt 66.2 kg (145 lb 15.1 oz)   LMP 03/28/2025 (Exact Date)   SpO2 98%   Breastfeeding No   BMI 24.29 kg/m²      Nursing note and vitals reviewed.  Constitutional: Well-developed and well-nourished. No distress.   HENT: Head is normocephalic and atraumatic. Oropharynx is clear and moist without exudate or erythema.   Eyes: Pupils are equal, round, and reactive to light. Conjunctiva are normal.   Cardiovascular: Normal rate and regular rhythm. No murmur heard. Normal radial pulses.  Pulmonary/Chest: Breath sounds normal. No wheezes or rales.   Abdominal: Right lower flank tenderness. Soft and non-tender. No distention    Musculoskeletal: Extremities exhibit normal range of motion without edema or tenderness.   Neurological: Awake, alert and oriented to person, place, and time. No focal deficits noted.  Skin: Skin is warm and dry. No rash.   Psychiatric: Normal mood and affect. Appropriate for clinical situation    DIAGNOSTIC STUDIES    Labs:   Results for orders placed or performed during the hospital encounter of 04/21/25   CBC WITH DIFFERENTIAL    Collection Time: 04/21/25 10:24 AM   Result Value Ref Range    WBC 12.1 (H) 4.8 - 10.8 K/uL    RBC 4.46 4.20 - 5.40 " M/uL    Hemoglobin 11.9 (L) 12.0 - 16.0 g/dL    Hematocrit 35.5 (L) 37.0 - 47.0 %    MCV 79.6 (L) 81.4 - 97.8 fL    MCH 26.7 (L) 27.0 - 33.0 pg    MCHC 33.5 32.2 - 35.5 g/dL    RDW 42.9 35.9 - 50.0 fL    Platelet Count 320 164 - 446 K/uL    MPV 8.8 (L) 9.0 - 12.9 fL    Neutrophils-Polys 81.10 (H) 44.00 - 72.00 %    Lymphocytes 8.00 (L) 22.00 - 41.00 %    Monocytes 10.00 0.00 - 13.40 %    Eosinophils 0.10 0.00 - 6.90 %    Basophils 0.20 0.00 - 1.80 %    Immature Granulocytes 0.60 0.00 - 0.90 %    Nucleated RBC 0.00 0.00 - 0.20 /100 WBC    Neutrophils (Absolute) 9.77 (H) 1.82 - 7.42 K/uL    Lymphs (Absolute) 0.97 (L) 1.00 - 4.80 K/uL    Monos (Absolute) 1.21 (H) 0.00 - 0.85 K/uL    Eos (Absolute) 0.01 0.00 - 0.51 K/uL    Baso (Absolute) 0.03 0.00 - 0.12 K/uL    Immature Granulocytes (abs) 0.07 0.00 - 0.11 K/uL    NRBC (Absolute) 0.00 K/uL   COMP METABOLIC PANEL    Collection Time: 04/21/25 10:24 AM   Result Value Ref Range    Sodium 136 135 - 145 mmol/L    Potassium 3.7 3.6 - 5.5 mmol/L    Chloride 102 96 - 112 mmol/L    Co2 20 20 - 33 mmol/L    Anion Gap 14.0 7.0 - 16.0    Glucose 102 (H) 65 - 99 mg/dL    Bun 6 (L) 8 - 22 mg/dL    Creatinine 0.94 0.50 - 1.40 mg/dL    Calcium 8.5 8.5 - 10.5 mg/dL    Correct Calcium 8.9 8.5 - 10.5 mg/dL    AST(SGOT) 24 12 - 45 U/L    ALT(SGPT) 19 2 - 50 U/L    Alkaline Phosphatase 60 30 - 99 U/L    Total Bilirubin 0.5 0.1 - 1.5 mg/dL    Albumin 3.5 3.2 - 4.9 g/dL    Total Protein 7.5 6.0 - 8.2 g/dL    Globulin 4.0 (H) 1.9 - 3.5 g/dL    A-G Ratio 0.9 g/dL   LIPASE    Collection Time: 04/21/25 10:24 AM   Result Value Ref Range    Lipase 33 11 - 82 U/L   HCG QUAL SERUM    Collection Time: 04/21/25 10:24 AM   Result Value Ref Range    Beta-Hcg Qualitative Serum Negative Negative   ESTIMATED GFR    Collection Time: 04/21/25 10:24 AM   Result Value Ref Range    GFR (CKD-EPI) 84 >60 mL/min/1.73 m 2   URINALYSIS,CULTURE IF INDICATED    Collection Time: 04/21/25 10:33 AM    Specimen: Urine,  Clean Catch   Result Value Ref Range    Color Yellow     Character Cloudy (A)     Specific Gravity 1.016 <1.035    Ph 6.0 5.0 - 8.0    Glucose Negative Negative mg/dL    Ketones Trace (A) Negative mg/dL    Protein 30 (A) Negative mg/dL    Bilirubin Negative Negative    Urobilinogen, Urine 2.0 (A) <=1.0 EU/dL    Nitrite Positive (A) Negative    Leukocyte Esterase Large (A) Negative    Occult Blood Large (A) Negative    Micro Urine Req Microscopic    URINE MICROSCOPIC (W/UA)    Collection Time: 04/21/25 10:33 AM   Result Value Ref Range    WBC >100 (A) /hpf    RBC 21-50 (A) 0 - 2 /hpf    Bacteria Many (A) None /hpf    Epithelial Cells 3-5 0 - 5 /hpf    Urine Casts 0-2 0 - 2 /lpf     Radiology:   This attending emergency physician has independently interpreted the diagnostic imaging associated with this visit and is awaiting the final reading from the radiologist.   Preliminary interpretation is a follows: CT scan shows no apparent ureterolithiasis.    Radiologist interpretation:   CT-RENAL COLIC EVALUATION(A/P W/O)   Final Result      1.  Mild right perinephric fat stranding and slight dilatation of the right renal pelvis. No obstructing stone is seen. This could be related to infection or recently passed stone.   2.  Cholelithiasis.                    ASSESSMENT AND PLAN    11:04 AM - Patient was evaluated at bedside. Patient presents today for evaluation of right flank pain. Ordered for UA w/ culture, Lipase, CMP, CBC w/ Diff, Urine Microscopic, and CT Renal Colic W/O to evaluate. The patient will be medicated with Rocephin 2,000 mg for her symptoms. Patient verbalizes understanding and support with my plan of care.  Differential diagnoses include but not limited to: musculoskeletal pain, UTI, and kidney stones.     2:34 PM - CT results demonstrate no visible kidney stones. Symptoms are consistent with pyelonephritis.    Patient's urinalysis is consistent with infection.  CT scan shows no evidence of obstruction.   Overall clinical presentation consistent with pyelonephritis.  In the emergency department the patient's symptoms are well-controlled.  She is treated with a dose of IV ceftriaxone.  Will be sent home with a week of Bactrim.  Return precautions discussed with the patient and her .    DISPOSITION AND DISCUSSIONS    I have discussed management of the patient with the following physicians and ANTONIA's:  None    Discussion of management with other Hospitals in [place] or appropriate source(s): None     The patient will return for new or worsening symptoms and is stable at the time of discharge.    DISPOSITION:  Patient will be discharged home in stable condition.    FOLLOW UP:  MARCIA Clarke  [address] [facility] [address] [facility] [id number] [phone]    Schedule an appointment as soon as possible for a visit       [facility], Emergency Dept  [address] [address] [phone]    If symptoms worsen      OUTPATIENT MEDICATIONS:  New Prescriptions    SULFAMETHOXAZOLE-TRIMETHOPRIM (BACTRIM DS) 800-160 MG TABLET    Take 1 Tablet by mouth 2 times a day for 7 days.         FINAL DIAGNOSIS  1. Flank pain    2. Pyelonephritis         Jose KIM (Balbina), am scribing for, and in the presence of, Brent Abdi M.D..    Electronically signed by: Jose Wills), 4/21/2025    Brent KIM M.D. personally performed the services described in this documentation, as scribed by Jose Marin in my presence, and it is both accurate and complete.      The note accurately reflects work and decisions made by me.  Brent Abdi M.D.  4/21/2025  2:45 PM

## 2025-04-21 NOTE — ED TRIAGE NOTES
Chief Complaint   Patient presents with    Flank Pain     Right sided, intermittent for the last 3 weeks. Reports HA, and intermittent fevers and chills. Also reports foul urine odor     Pt ambulated to triage for above.     Protocol initiated, pt educated on triage process, returned to ED lobby.

## 2025-04-21 NOTE — LETTER
4/23/2025               Tony Foreman  [address] [facility] [address]        Dear Dmitribren (MR#[id number])    As we have been unable to contact you by phone, this letter is sent in regards to your recent visit to the [facility] Emergency Department on 4/21/2025. During the visit, tests were performed to assist the physician in a medical diagnosis. A review of those tests requires that we notify you of the following:    Your urine culture and sensitivity was POSITIVE for a bacteria called Escherichia coli, and further treatment may be necessary. The currently prescribed antibiotic (sulfamethoxazole-trimethoprim) may not be effective in treating your infection. IF YOU ARE NOT FEELING BETTER PLEASE CONTACT ME AS SOON AS POSSIBLE AT THE NUMBER BELOW.       Thank you for your cooperation in the matter.    Sincerely,  ED Culture Follow-Up Staff  Charlotte Freedman, PharmD    [facility], Emergency Department  [address] [place] [id number] [phone]

## 2025-04-23 LAB
BACTERIA UR CULT: ABNORMAL
SIGNIFICANT IND 70042: ABNORMAL
SITE SITE: ABNORMAL
SOURCE SOURCE: ABNORMAL

## 2025-04-23 RX ORDER — LEVOFLOXACIN 500 MG/1
750 TABLET, FILM COATED ORAL DAILY
Qty: 5 TABLET | Refills: 0 | Status: CANCELLED | OUTPATIENT
Start: 2025-04-23

## 2025-04-23 RX ORDER — LEVOFLOXACIN 750 MG/1
750 TABLET, FILM COATED ORAL DAILY
Qty: 5 TABLET | Refills: 0 | Status: ACTIVE | OUTPATIENT
Start: 2025-04-23

## 2025-04-23 NOTE — ED NOTES
ED Positive Culture Follow-up/Notification Note:   Date: 4/23/2025    Patient seen in the ED on 4/21/2025 for flank pain, intermittent fevers, chills, and foul urine. UA suggestive of infection. CBC demonstrated leukocytosis 12.1. Urine cultures were obtained and patient discharged with 7 days of Bactrim. Cultures now positive for E coli.     1. Flank pain    2. Pyelonephritis      Discharge Medication List as of 4/21/2025  2:43 PM        START taking these medications    Details   sulfamethoxazole-trimethoprim (BACTRIM DS) 800-160 MG tablet Take 1 Tablet by mouth 2 times a day for 7 days., Disp-14 Tablet, R-0, Normal           Allergies: Patient has no known allergies.    Vitals:    04/21/25 1016 04/21/25 1101 04/21/25 1301 04/21/25 1401   BP:  93/56 94/55 97/59   Pulse:  94 84 86   Resp:    14   Temp:    36.8 °C (98.2 °F)   TempSrc:    Temporal   SpO2:  99% 98% 98%   Weight: 66.2 kg (145 lb 15.1 oz)      Height:           Final cultures:   Results       Procedure Component Value Units Date/Time    URINE CULTURE(NEW) [[id number]]  (Abnormal)  (Susceptibility) Collected: 04/21/25 1033    Order Status: Completed Specimen: Urine Updated: 04/23/25 1356     Significant Indicator POS     Source UR     Site -     Culture Result -      Escherichia coli  >100,000 cfu/mL        Gardnerella vaginalis  ,000 cfu/mL      Susceptibility       Escherichia coli (1)       Antibiotic Interpretation Microscan   Method Status    Ampicillin/sulbactam Resistant >16/8 mcg/mL JANE Final    Amikacin  [*]  Sensitive <=16 mcg/mL JANE Final    Ampicillin Resistant >16 mcg/mL JANE Final    Amoxicillin/Clavulanic Acid Intermediate 16/8 mcg/mL JANE Final    Aztreonam  [*]  Sensitive <=4 mcg/mL JANE Final    Ceftolozane+Tazobactam  [*]  Sensitive <=2 mcg/mL JANE Final    Ceftriaxone Sensitive <=1 mcg/mL JANE Final    Ceftazidime  [*]  Sensitive <=1 mcg/mL JANE Final    Cefazolin Resistant >16 mcg/mL JANE Final     Breakpoints when Cefazolin is used  for therapy of infections  other than uncomplicated UTIs due to Enterobacterales are as  follows:  JANE and Interpretation:  <=2 S  4 I  >=8 R         Ciprofloxacin Sensitive <=0.25 mcg/mL JANE Final    Cefepime Sensitive <=2 mcg/mL JANE Final    Cefuroxime Sensitive <=4 mcg/mL JANE Final    Ceftazidime+Avibactam  [*]  Sensitive <=4 mcg/mL JANE Final    Ertapenem  [*]  Sensitive <=0.5 mcg/mL JANE Final    Nitrofurantoin Sensitive <=32 mcg/mL JANE Final    Gentamicin Resistant >8 mcg/mL JANE Final    Imipenem  [*]  Sensitive <=1 mcg/mL JANE Final    Levofloxacin Sensitive <=0.5 mcg/mL JANE Final    Meropenem Sensitive <=1 mcg/mL JANE Final    Meropenem/Vaborbactam Sensitive <=2 mcg/mL JANE Final    Minocycline Sensitive <=4 mcg/mL JANE Final    Pip/Tazobactam Sensitive <=8 mcg/mL JANE Final    Trimeth/Sulfa Resistant >2/38 mcg/mL JANE Final    Tetracycline  [*]  Resistant >8 mcg/mL JANE Final    Tigecycline Sensitive <=2 mcg/mL JANE Final    Tobramycin Resistant 8 mcg/mL JANE Final               [*]  Suppressed Antibiotic                   URINALYSIS,CULTURE IF INDICATED [[id number]]  (Abnormal) Collected: 04/21/25 1033    Order Status: Completed Specimen: Urine, Clean Catch Updated: 04/21/25 1138     Color Yellow     Character Cloudy     Specific Gravity 1.016     Ph 6.0     Glucose Negative mg/dL      Ketones Trace mg/dL      Protein 30 mg/dL      Bilirubin Negative     Urobilinogen, Urine 2.0 EU/dL      Nitrite Positive     Leukocyte Esterase Large     Occult Blood Large     Micro Urine Req Microscopic            Plan:   Isolated organism is resistant to prescribed therapy.  Attempted to call patient x2, straight to voicemail. Left voicemail with callback number and sent [employer] message informing patient of results with request to call back.     New Rx:  Levaquin 750mg x 5 days #5, no refills - MD: George per protocol  Sent to same pharmacy as discharge prescriptions.   Charlotte Freedman, PharmD

## (undated) DEVICE — SLEEVE, SEQUENTIAL CALF REG

## (undated) DEVICE — SUTURE 2-0 CHROMIC GUT CT-1 27 (36PK/BX)"

## (undated) DEVICE — SET EXTENSION WITH 2 PORTS (48EA/CA) ***PART #2C8610 IS A SUBSTITUTE*****

## (undated) DEVICE — TRAY BLADDER CARE W/ 16 FR FOLEY CATHETER STATLOCK  (10/CA)

## (undated) DEVICE — PENCIL ELECTSURG 10FT HLSTR - WITH BLADE (50EA/CA)

## (undated) DEVICE — WATER IRRIGATION STERILE 1000ML (12EA/CA)

## (undated) DEVICE — GLOVE BIOGEL SZ 8 SURGICAL PF LTX - (50PR/BX 4BX/CA)

## (undated) DEVICE — SUTURE 3-0 VICRYL PLUS CT-1 - 36 INCH (36/BX)

## (undated) DEVICE — RETRACTOR O C SECTION LRY - (5/BX)

## (undated) DEVICE — LACTATED RINGERS INJ 1000 ML - (14EA/CA 60CA/PF)

## (undated) DEVICE — TUBING CLEARLINK DUO-VENT - C-FLO (48EA/CA)

## (undated) DEVICE — KIT  I.V. START (100EA/CA)

## (undated) DEVICE — CATHETER IV NON-SAFETY 18 GA X 1 1/4 (50/BX 4BX/CA)

## (undated) DEVICE — STAPLER SKIN DISP - (6/BX 10BX/CA) VISISTAT

## (undated) DEVICE — SUTURE 1 CHROMIC CTX ETHICON - (36PK/BX)

## (undated) DEVICE — SUTURE 0 VICRYL PLUS CT 36 (36PK/BX)"

## (undated) DEVICE — TAPE CLOTH MEDIPORE 6 INCH - (12RL/CA)

## (undated) DEVICE — HEAD HOLDER JUNIOR/ADULT

## (undated) DEVICE — SPONGE GAUZESTER 4 X 4 4PLY - (128PK/CA)

## (undated) DEVICE — DRESSING INTERCEED ABSORBABLE ADHESION BARRIER TC7 (10EA/CA)

## (undated) DEVICE — TELFA 8 X 10 BIOSEAL - (50EA/CA)

## (undated) DEVICE — SUTURE 0 VICRYL PLUS CT-1 - 36 INCH (36/BX)

## (undated) DEVICE — BLANKET UNDERBODY FULL ACCES - (5/CA)

## (undated) DEVICE — TRAY SPINAL ANESTHESIA NON-SAFETY (10/CA)

## (undated) DEVICE — PACK C-SECTION (2EA/CA)

## (undated) DEVICE — SODIUM CHL IRRIGATION 0.9% 1000ML (12EA/CA)

## (undated) DEVICE — PACK ROOM TURNOVER L&D (12/CA)

## (undated) DEVICE — SHEATH RO 4F 10CM (10EA/BX)

## (undated) DEVICE — CHLORAPREP 26 ML APPLICATOR - ORANGE TINT(25/CA)

## (undated) DEVICE — CANISTER SUCTION 3000ML MECHANICAL FILTER AUTO SHUTOFF MEDI-VAC NONSTERILE LF DISP  (40EA/CA)

## (undated) DEVICE — DETERGENT RENUZYME PLUS 10 OZ PACKET (50/BX)

## (undated) DEVICE — DRESSING POST OP BORDER 4 X 10 (5EA/BX)

## (undated) DEVICE — WATER IRRIG. STER. 1500 ML - (9/CA)

## (undated) DEVICE — PAD GROUNDING PRE-JELLED - (50EA/PK)